# Patient Record
Sex: FEMALE | Race: WHITE | Employment: FULL TIME | URBAN - METROPOLITAN AREA
[De-identification: names, ages, dates, MRNs, and addresses within clinical notes are randomized per-mention and may not be internally consistent; named-entity substitution may affect disease eponyms.]

---

## 2018-07-13 ENCOUNTER — HOSPITAL ENCOUNTER (OUTPATIENT)
Dept: MAMMOGRAPHY | Age: 58
Discharge: HOME | End: 2018-07-13
Attending: FAMILY MEDICINE
Payer: COMMERCIAL

## 2018-07-13 DIAGNOSIS — Z12.31 VISIT FOR SCREENING MAMMOGRAM: ICD-10-CM

## 2018-07-13 PROCEDURE — 77063 BREAST TOMOSYNTHESIS BI: CPT

## 2018-07-18 ENCOUNTER — HOSPITAL ENCOUNTER (OUTPATIENT)
Dept: ULTRASOUND IMAGING | Age: 58
Discharge: HOME | End: 2018-07-18
Attending: FAMILY MEDICINE
Payer: COMMERCIAL

## 2018-07-18 ENCOUNTER — HOSPITAL ENCOUNTER (OUTPATIENT)
Dept: MAMMOGRAPHY | Age: 58
Discharge: HOME | End: 2018-07-18
Attending: FAMILY MEDICINE
Payer: COMMERCIAL

## 2018-07-18 DIAGNOSIS — N63.11 MASS OF UPPER OUTER QUADRANT OF RIGHT BREAST: ICD-10-CM

## 2018-07-18 DIAGNOSIS — R92.0 BREAST MICROCALCIFICATIONS: ICD-10-CM

## 2018-07-18 DIAGNOSIS — N63.10 BREAST MASS, RIGHT: ICD-10-CM

## 2018-07-18 PROCEDURE — 77065 DX MAMMO INCL CAD UNI: CPT

## 2018-07-18 PROCEDURE — 19084 BX BREAST ADD LESION US IMAG: CPT

## 2018-07-18 PROCEDURE — 74011000250 HC RX REV CODE- 250: Performed by: RADIOLOGY

## 2018-07-18 PROCEDURE — 77061 BREAST TOMOSYNTHESIS UNI: CPT

## 2018-07-18 PROCEDURE — 19083 BX BREAST 1ST LESION US IMAG: CPT

## 2018-07-18 PROCEDURE — 88305 TISSUE EXAM BY PATHOLOGIST: CPT | Performed by: RADIOLOGY

## 2018-07-18 PROCEDURE — A4648 IMPLANTABLE TISSUE MARKER: HCPCS

## 2018-07-18 PROCEDURE — 88374 M/PHMTRC ALYS ISHQUANT/SEMIQ: CPT | Performed by: RADIOLOGY

## 2018-07-18 PROCEDURE — 76642 ULTRASOUND BREAST LIMITED: CPT

## 2018-07-18 PROCEDURE — 88360 TUMOR IMMUNOHISTOCHEM/MANUAL: CPT | Performed by: RADIOLOGY

## 2018-07-18 RX ORDER — LIDOCAINE HYDROCHLORIDE AND EPINEPHRINE 10; 10 MG/ML; UG/ML
10 INJECTION, SOLUTION INFILTRATION; PERINEURAL
Status: COMPLETED | OUTPATIENT
Start: 2018-07-18 | End: 2018-07-18

## 2018-07-18 RX ORDER — LIDOCAINE HYDROCHLORIDE 10 MG/ML
10 INJECTION INFILTRATION; PERINEURAL
Status: COMPLETED | OUTPATIENT
Start: 2018-07-18 | End: 2018-07-18

## 2018-07-18 RX ADMIN — LIDOCAINE HYDROCHLORIDE,EPINEPHRINE BITARTRATE 10 ML: 10; .01 INJECTION, SOLUTION INFILTRATION; PERINEURAL at 12:34

## 2018-07-18 RX ADMIN — LIDOCAINE HYDROCHLORIDE 2.5 ML: 10 INJECTION, SOLUTION INFILTRATION; PERINEURAL at 12:23

## 2018-07-18 RX ADMIN — LIDOCAINE HYDROCHLORIDE,EPINEPHRINE BITARTRATE 6 ML: 10; .01 INJECTION, SOLUTION INFILTRATION; PERINEURAL at 12:14

## 2018-07-18 RX ADMIN — LIDOCAINE HYDROCHLORIDE 5.5 ML: 10 INJECTION, SOLUTION INFILTRATION; PERINEURAL at 12:08

## 2018-07-25 ENCOUNTER — NURSE NAVIGATOR (OUTPATIENT)
Dept: OTHER | Age: 58
End: 2018-07-25

## 2018-07-25 NOTE — NURSE NAVIGATOR
Telephone call to pt with new diagnosis of breast cancer for assessment of barriers. Unable to speak with pt at this time. Left message for her to call me.

## 2018-07-26 ENCOUNTER — TELEPHONE (OUTPATIENT)
Dept: OTHER | Age: 58
End: 2018-07-26

## 2018-07-26 NOTE — TELEPHONE ENCOUNTER
Call to pt newly diagnosed with breast cancer for assessment of barriers. Unable to speak with pt at this time. Unable to leave a message as voice box is full.

## 2018-08-02 ENCOUNTER — NURSE NAVIGATOR (OUTPATIENT)
Dept: OTHER | Age: 58
End: 2018-08-02

## 2018-08-02 ENCOUNTER — OFFICE VISIT (OUTPATIENT)
Dept: SURGERY | Age: 58
End: 2018-08-02

## 2018-08-02 VITALS
TEMPERATURE: 97.5 F | RESPIRATION RATE: 14 BRPM | OXYGEN SATURATION: 98 % | HEART RATE: 84 BPM | BODY MASS INDEX: 21.19 KG/M2 | SYSTOLIC BLOOD PRESSURE: 109 MMHG | HEIGHT: 70 IN | DIASTOLIC BLOOD PRESSURE: 60 MMHG | WEIGHT: 148 LBS

## 2018-08-02 DIAGNOSIS — C50.811 MALIGNANT NEOPLASM OF OVERLAPPING SITES OF RIGHT FEMALE BREAST, UNSPECIFIED ESTROGEN RECEPTOR STATUS (HCC): ICD-10-CM

## 2018-08-02 DIAGNOSIS — C50.911 MALIGNANT NEOPLASM OF RIGHT FEMALE BREAST, UNSPECIFIED ESTROGEN RECEPTOR STATUS, UNSPECIFIED SITE OF BREAST (HCC): Primary | ICD-10-CM

## 2018-08-02 RX ORDER — NORTRIPTYLINE HYDROCHLORIDE 10 MG/1
CAPSULE ORAL
Refills: 12 | COMMUNITY
Start: 2018-08-02

## 2018-08-02 NOTE — PATIENT INSTRUCTIONS
If you have any questions or concerns about today's appointment, the verbal and/or written instructions you were given for follow up care, please call our office at [phone]. [facility] [place]  [address] [facility], [address]    [phone] office  [phone] fax      Appointment: Tuesday, August 7, 2018 2:00pm/check in at 1:45pm with Dr. Sheeba Allred/Dr. Marcin Byrnes at [facility] located at [address] [address]) [phone]    Appointment: Wednesday, August 8, 2018 at 9:00am, check in at main entrance of [facility] (O) [phone]. Go to Patient Registration located directly behind the main lobby  desk         PATIENT [address]   [address]  [phone]    Before Surgery Instructions:   1) You must have someone available to drive you to and from your procedure and stay with you for the first 24 hours. 2) It is very important that you have nothing to eat or drink after midnight the night before your surgery. This includes chewing gum or sucking on hard candy. Take only heart, blood pressure and cholesterol medications the morning of surgery with only a sip of water. 3) Please stop taking Plavix 10 days prior to your surgery. Stop taking Coumadin 5 days prior to your surgery. Stop taking all Aspirin or Aspirin containing products 7 days prior to your surgery. Stop taking Advil, Motrin, Aleve, and etc. 3 days prior to your surgery. 4) If you take any diabetic medications please consult with your primary care physician on how to take them on the day of your surgery  5) Please stop all Herbal products 2 weeks prior to your surgery. 6) Please arrive at the hospital 2 hours prior to your surgery, unless you have been otherwise instructed.   7) Patients having an operation on their colon will be given a separate instruction sheet on their Bowel Prep. 8) For any pre-operative work up check in at the main entrance to [address] [place] [address], and then go to Patient Registration. These studies are done on a walk in basis they are open from 7:00am to 5:00pm Monday through Friday. 9) Please wash your surgical site the morning of your surgery with soap and water. 10) If you are of child bearing age you will have pregnancy test done the morning of your surgery as soon as you arrive. 11) You may be contacted to change your surgery time. At times this is necessary due to equipment or staffing needs. After Surgery Instructions: You will need to be seen in the office for a follow-up visit 7-14 days after your surgery. Please call after you have had the procedure to make this appointment. Unless otherwise instructed, you may remove your outer bandage and shower 48 hours after your surgery. If you develop a fever greater than 101, have any significant drainage, bleeding, swelling and/or pus of the wound. Please call our office immediately. Surgery Date and Time:  Tuesday, August 14, 2018 at 9:00am    Please check in at [facility], enter through the Emergency Room entrance and go up to the second floor. Please check in by 6:00am  the day of your surgery. You may contact Erin Bauman with any questions at [phone].

## 2018-08-02 NOTE — MR AVS SNAPSHOT
[facility] 
 
 
 [address] [facility] [address] [address] 
[phone] Patient: Jovany Suero MRN: [id number] XDT:[id number] Visit Information Date & Time Provider Department Dept. Phone Encounter #  
 8/2/2018  2:30 PM Ravinder Miller MD [address] [phone] [id number] Your Appointments 8/9/2018 11:15 AM  
Follow Up with Ravinder Miller MD  
[address] [facility] App[facility]: Follow up after Rad Onc appt [address] [facility] [address]  
  
   
 [id number] [place] [address] 88 710 [facility] [address] Upcoming Health Maintenance Date Due Hepatitis C Screening 1960 Pneumococcal 19-64 Medium Risk (1 of 1 - PPSV23) 2/8/1981 DTaP/Tdap/Td series (1 - Tdap) 2/8/1983 PAP AKA CERVICAL CYTOLOGY 2/8/1983 FOBT Q 1 YEAR AGE 50-75 2/8/2012 Influenza Age 5 to Adult 8/1/2018 BREAST CANCER SCRN MAMMOGRAM 7/18/2020 Allergies as of 8/2/2018  Review Complete On: 8/2/2018 By: Ravinder Miller MD  
  
 Severity Noted Reaction Type Reactions Pcn [Penicillins] High 03/12/2013   Systemic Rash Hives with PCN G benzathine Current Immunizations  Reviewed on 5/24/2013 No immunizations on file. Not reviewed this visit You Were Diagnosed With   
  
 Codes Comments Malignant neoplasm of right female breast, unspecified estrogen receptor status, unspecified site of breast ([facility]    -  Primary ICD-10-CM: C50.911 ICD-9-CM: 174.9 Vitals BP Pulse Temp Resp Height(growth percentile) Weight(growth percentile) 109/60 (BP 1 Location: Right arm, BP Patient Position: Sitting) 84 97.5 °F (36.4 °C) (Oral) 14 5' 10\" (1.778 m) 148 lb (67.1 kg) SpO2 BMI OB Status Smoking Status 98% 21.24 kg/m2 Hysterectomy Current Every Day Smoker BMI and BSA Data Body Mass Index Body Surface Area  
 21.24 kg/m 2 1.82 m 2 Preferred Pharmacy Pharmacy Name Phone [id number] [address], [address] [phone] Your Updated Medication List  
  
   
This list is accurate as of 8/2/18  4:17 PM.  Always use your most recent med list.  
  
  
  
  
 HYDROcodone-acetaminophen 5-325 mg per tablet Commonly known as:  Theenrico Johnson Take 2 Tabs by mouth every eight (8) hours as needed for Pain. Iron 325 mg (65 mg iron) tablet Generic drug:  ferrous sulfate Take  by mouth Daily (before breakfast). nortriptyline 10 mg capsule Commonly known as:  PAMELOR  
  
 PREMARIN 0.625 mg tablet Generic drug:  conjugated estrogens Take 0.625 mg by mouth. To-Do List   
 08/02/2018 Lab:  CBC WITH AUTOMATED DIFF   
  
 08/02/2018 ECG:  EKG, 12 LEAD, INITIAL   
  
 08/02/2018 Lab:  METABOLIC PANEL, COMPREHENSIVE   
  
 08/02/2018 Imaging:  MRI BREAST BI W WO CONT   
  
 08/02/2018 Imaging:  XR CHEST PA LAT   
  
 08/07/2018 1:45 PM  
  Appointment with [address] [place] [address] at [facility] RADIATION THERAPY ([phone]) ATTENTION: The Appointment Time in [facility] [facility] [address] may not reflect your scheduled appointment time as the time is only a place soriano. If you have any questions about your appointment time, please call [facility] Radiation Therapy at [phone].  
  
 08/08/2018 9:00 AM  
  Appointment with [facility] MRI RM 1 at [address] [facility] ([phone]) GENERAL INSTRUCTIONS  You will be required to lie still face down for 45-60 minutes. Bring information (ID card) if you have any medically implanted devices. Remove any jewelry (including body piercing, hairpins) prior to MRI. If you have had a creatinine level drawn within the past 30 days, please bring most recent results to your appt.   Bring all prior Mammogram, Breast Ultrasound, and Breast MRI films, CD's, and reports with you on the day of your exam.  This only includes studies done outside of [address] [place] [address], \[facility] [place] [facility]\"", Barak, and Luis. Bring a complete list of all medications you are currently taking to include prescriptions, over-the-counter meds, herbals, vitamins & any dietary supplements. If you were given medications for claustrophobia or anxiety, please arrange to have someone drive you to your appointment. QUESTIONS  Notify the MRI Department if you have any questions concerning your study. [facility] [address] Referral Information Referral ID Referred By Referred To  
  
 [id number] ANGI DONG Not Available Visits Status Start Date End Date 1 New Request 8/2/18 8/2/19 If your referral has a status of pending review or denied, additional information will be sent to support the outcome of this decision. Referral ID Referred By Referred To  
 [id number] DONG WHITLEY Not Available Visits Status Start Date End Date 1 New Request 8/2/18 8/2/19 If your referral has a status of pending review or denied, additional information will be sent to support the outcome of this decision. Patient Instructions If you have any questions or concerns about today's appointment, the verbal and/or written instructions you were given for follow up care, please call our office at [phone]. [facility] - [address], [address] [place] [phone] office [phone] fax Appointment: Tuesday, August 7, 2018 2:00pm/check in at 1:45pm with Dr. Adam Allred/Dr. Thomas Tam at [facility] located at [facility] [address] [facility] [address]) [phone] Appointment:  Wednesday, August 8, 2018 at 9:00am, check in at main entrance of [facility] [facility] (T) [phone]. Go to Patient Registration located directly behind the main lobby  desk PATIENT PRE AND POST OPERATIVE INSTRUCTIONS [address] [place] [address] [facility] [address] 
[phone] Before Surgery Instructions:  
1) You must have someone available to drive you to and from your procedure and stay with you for the first 24 hours. 2) It is very important that you have nothing to eat or drink after midnight the night before your surgery. This includes chewing gum or sucking on hard candy. Take only heart, blood pressure and cholesterol medications the morning of surgery with only a sip of water. 3) Please stop taking Plavix 10 days prior to your surgery. Stop taking Coumadin 5 days prior to your surgery. Stop taking all Aspirin or Aspirin containing products 7 days prior to your surgery. Stop taking Advil, Motrin, Aleve, and etc. 3 days prior to your surgery. 4) If you take any diabetic medications please consult with your primary care physician on how to take them on the day of your surgery 5) Please stop all Herbal products 2 weeks prior to your surgery. 6) Please arrive at the hospital 2 hours prior to your surgery, unless you have been otherwise instructed. 7) Patients having an operation on their colon will be given a separate instruction sheet on their Bowel Prep. 8) For any pre-operative work up check in at the main entrance to [address] [place] [address], and then go to Patient Registration. These studies are done on a walk in basis they are open from 7:00am to 5:00pm Monday through Friday. 9) Please wash your surgical site the morning of your surgery with soap and water. 10) If you are of child bearing age you will have pregnancy test done the morning of your surgery as soon as you arrive. 11) You may be contacted to change your surgery time. At times this is necessary due to equipment or staffing needs. After Surgery Instructions: You will need to be seen in the office for a follow-up visit 7-14 days after your surgery. Please call after you have had the procedure to make this appointment. Unless otherwise instructed, you may remove your outer bandage and shower 48 hours after your surgery. If you develop a fever greater than 101, have any significant drainage, bleeding, swelling and/or pus of the wound. Please call our office immediately. Surgery Date and Time:  Tuesday, August 14, 2018 at 9:00am 
 
Please check in at [facility], enter through the Emergency Room entrance and go up to the second floor. Please check in by 6:00am  the day of your surgery. You may contact Tosin Nickerson with any questions at [phone]. Introducing [facility]! Darwin Walton introduces [employer] patient portal. Now you can access parts of your medical record, email your doctor's office, and request medication refills online. 1. In your internet browser, go to [email or web address] 2. Click on the First Time User? Click Here link in the Sign In box. You will see the New Member Sign Up page. 3. Enter your Finder[employer] Access Code exactly as it appears below. You will not need to use this code after youve completed the sign-up process. If you do not sign up before the expiration date, you must request a new code. · F[employer] Access Code: [email or web address] Expires: 10/8/2018 10:12 AM 
 
4. Enter the last four digits of your Social Security Number (xxxx) and Date of Birth (mm/dd/yyyy) as indicated and click Submit. You will be taken to the next sign-up page. 5. Create a IngagePatientt ID. This will be your Finderly login ID and cannot be changed, so think of one that is secure and easy to remember. 6. Create a Finder[employer] password. You can change your password at any time. 7. Enter your Password Reset Question and Answer. This can be used at a later time if you forget your password. 8. Enter your e-mail address. You will receive e-mail notification when new information is available in [address] 9. Click Sign Up. You can now view and download portions of your medical record. 10. Click the Download Summary menu link to download a portable copy of your medical information. If you have questions, please visit the Frequently Asked Questions section of the [employer] website. Remember, [employer] is NOT to be used for urgent needs. For medical emergencies, dial 911. Now available from your iPhone and Android! Please provide this summary of care documentation to your next provider. Your primary care clinician is listed as Urmila Mann. If you have any questions after today's visit, please call [phone].

## 2018-08-02 NOTE — COMMUNICATION BODY
Dear Dr. Mellisa Anderson came to the office today for evaluation and discussion regarding surgical treatment of her recently diagnosed carcinomas of the right breast.  She has 2 simultaneous lesions-one located at the 8 o'clock position and the other one at the 10 o'clock position. Each of these are invasive carcinomas and apparently each estrogen and progesterone receptor positive but HER-2 negative. She has never had any previous breast problems. Her mother developed breast cancer at about the age of 76 and a paternal grandmother at about the age of 76. On examination today she does have 2 palpable masses in the locations mentioned above. Medically I do not feel any axillary adenopathy. I recommended that we get an MRI first and consultation with radiation oncology. But I have recommended that she probably can have a right breast partial mastectomy (lumpectomy) and sentinel node biopsy.   We will be proceeding with her surgery fairly soon per her request.    With kindest regards,    Yamel Mi MD    Cc: Dr. Anabella Kiser

## 2018-08-02 NOTE — PROGRESS NOTES
History of present illness    Marleni Reji comes to the office today for newly diagnosed right breast cancer. The patient had a recent right mammogram that showed 2 spiculated areas in the lateral right breast at the 8 o'clock position 4 cm from the nipple and at the 10 o'clock position 7 cm from the nipple. Stereotactic biopsies of each of these were performed and both of these showed invasive ductal carcinoma apparently grade 2. It appears that they are both estrogen and progestin receptor positive but HER-2 negative. I have personally reviewed the patient's mammograms and discussed them with the radiologist.  The patient had been previously told her diagnosis by the radiologist.    The patient's menarche occurred at the age of 17. She had a hysterectomy in  because of heavy menstrual bleeding. She started having some mild hot flashes and believes she went through hormonal menopause in about . She did take birth control pills off and on for most 25 years. She is  4 para 4 and was age 27 at the time of her first pregnancy. She did not breast-feed. Her mother developed breast cancer at the age of 76 her paternal grandmother at the age of 76. She may have had a maternal great aunt also with breast cancer at the age of 76. There is no family history of ovarian cancer.     Allergies   Allergen Reactions    Pcn [Penicillins] Rash     Hives with PCN G benzathine     Past Medical History:   Diagnosis Date    Anemia NEC     Fibroids 3/12/2013    Pelvic pain 3/12/2013    Restless leg syndrome      Past Surgical History:   Procedure Laterality Date    HX BREAST BIOPSY Right 2018    HX  SECTION  0221,6380,1863    x3    HX HYSTERECTOMY      HX TUBAL LIGATION       Social History     Social History    Marital status:      Spouse name: N/A    Number of children: N/A    Years of education: N/A     Social History Main Topics    Smoking status: Current Every Day Smoker     Packs/day: 0.50     Types: Cigarettes    Smokeless tobacco: Never Used    Alcohol use No    Drug use: No    Sexual activity: Yes     Partners: Male      Comment: tubal     Other Topics Concern    None     Social History Narrative     REVIEW OF SYSTEMS     Constitutional: No fever, weight loss, fatigue or recent chills. Skin:  No recent rashes, dermatitis or abnormal moles. HEENT:  No changes in vision, vertigo, epistaxis, dysphasia, or hoarseness. Cardiac:  No chest pain, palpitations, or edema. Respiratory: No chronic cough, shortness of breath, wheezing, hemoptysis, or history of sleep apnea. Breasts/GYN:  No history of recent breast lumps or nipple discharge. Mammograms are up to date. No GYN-type problems. See the history of present illness     Gastrointestinal:  No significant food intolerances, no recent vomiting, no chronic abdominal pain, no change in bowel habits, no melena. No history of GERD. Genitourinary:  No history of hematuria, dysuria, frequency, or stress urinary incontinence. No nocturia. Musculoskeletal: No weakness, joint pains, or arthritis. Endocrine:  No history of thyroid disease. No diabetes. Lymph/hemo:  No history of blood transfusions or easy bruising. History of blood loss anemia secondary to big fibroids. Neuro: No dizziness or headaches or fainting. Visit Vitals    /60 (BP 1 Location: Right arm, BP Patient Position: Sitting)    Pulse 84    Temp 97.5 °F (36.4 °C) (Oral)    Resp 14    Ht 5' 10\" (1.778 m)    Wt 67.1 kg (148 lb)    SpO2 98%    BMI 21.24 kg/m2   PHYSICAL EXAM     Constitutional:  Well-developed, well-nourished, no acute distress. Head:  Head, eyes, ears, nose, throat within normal limits. Skin:  No suspicious moles or rashes. Neck:  No masses or adenopathy. The airway appears normal. Thyroid is not enlarged and there are no palpable thyroid nodules.      Lungs:  Lungs are clear to auscultation and percussion. No respiratory distress. No chest wall tenderness. Heart:  Heart is regular with no extra heart sounds or murmur heard. Breast Exam: The breasts are free of any discrete tenderness or masses except in the area where the patient had the recent biopsies of the lateral right breast.  The skin and nipple areas appear normal. Both axillae are negative. However the patient does have palpable nodular areas in the lateral right breast located in the 8 o'clock position almost 6 cm from the nipple in the 10 o'clock position 8 cm from the nipple. There is some bruising in this area to from the previous biopsies. It is unclear whether the palpable masses are small hematomas or the actual tumors that I am feeling. Abdomen: The abdomen is soft and nontender without organomegaly or masses. Bowel sounds are active and of normal pitch. There is no abdominal distention. No hernias are evident. Extremities:  No tenderness of the extremities and no significant swelling. Psych:  Alert and oriented. Assessment: Invasive ductal carcinoma in 2 areas of the right breast at 8:00 6 cm from the nipple and 10:00 8 cm from the nipple. Tumors are estrogen and progesterone receptor positive but HER-2 negative. On mammogram the mass in the 8 o'clock position measures about 6 mm in the 10 o'clock position about 2.2 cm. #2 history of anemia. Recommendations: I had a long discussion with the patient and her family. Options for treatment were discussed. I did recommend that she have an MRI to further evaluate these 2 lesions and their relationship. I do believe that the patient could probably have both lumps removed through a single incision but did state that this would certainly take out significant tissue from the breast and she would notice a difference in size. But a lumpectomy with subsequent radiation therapy presently would be my recommendation.   I have asked that she see the radiotherapist for their consultation and recommendations. Patient wishes to proceed soon with the surgery so we will see her back after accomplishing the above. The above was dictated with Kate. There may be unrecognized errors.     Isma Pérez MD

## 2018-08-03 ENCOUNTER — TELEPHONE (OUTPATIENT)
Dept: SURGERY | Age: 58
End: 2018-08-03

## 2018-08-03 ENCOUNTER — HOSPITAL ENCOUNTER (OUTPATIENT)
Dept: LAB | Age: 58
Discharge: HOME | End: 2018-08-03
Payer: COMMERCIAL

## 2018-08-03 ENCOUNTER — HOSPITAL ENCOUNTER (OUTPATIENT)
Dept: OTHER | Age: 58
Discharge: HOME | End: 2018-08-03
Payer: COMMERCIAL

## 2018-08-03 ENCOUNTER — HOSPITAL ENCOUNTER (OUTPATIENT)
Dept: PREADMISSION TESTING | Age: 58
Discharge: HOME | End: 2018-08-03
Payer: COMMERCIAL

## 2018-08-03 DIAGNOSIS — C50.911 MALIGNANT NEOPLASM OF RIGHT FEMALE BREAST, UNSPECIFIED ESTROGEN RECEPTOR STATUS, UNSPECIFIED SITE OF BREAST (HCC): ICD-10-CM

## 2018-08-03 DIAGNOSIS — C50.911 MALIGNANT NEOPLASM OF RIGHT FEMALE BREAST, UNSPECIFIED ESTROGEN RECEPTOR STATUS, UNSPECIFIED SITE OF BREAST (HCC): Primary | ICD-10-CM

## 2018-08-03 LAB
ALBUMIN SERPL-MCNC: 4.2 G/DL (ref 3.4–5)
ALBUMIN/GLOB SERPL: 1.5 {RATIO} (ref 0.8–1.7)
ALP SERPL-CCNC: 72 U/L (ref 45–117)
ALT SERPL-CCNC: 16 U/L (ref 13–56)
ANION GAP SERPL CALC-SCNC: 3 MMOL/L (ref 3–18)
AST SERPL-CCNC: 8 U/L (ref 15–37)
ATRIAL RATE: 66 BPM
BASOPHILS # BLD: 0 K/UL (ref 0–0.1)
BASOPHILS NFR BLD: 0 % (ref 0–2)
BILIRUB SERPL-MCNC: 0.3 MG/DL (ref 0.2–1)
BUN SERPL-MCNC: 13 MG/DL (ref 7–18)
BUN/CREAT SERPL: 21 (ref 12–20)
CALCIUM SERPL-MCNC: 9.4 MG/DL (ref 8.5–10.1)
CALCULATED P AXIS, ECG09: 71 DEGREES
CALCULATED R AXIS, ECG10: 68 DEGREES
CALCULATED T AXIS, ECG11: 71 DEGREES
CHLORIDE SERPL-SCNC: 105 MMOL/L (ref 100–108)
CO2 SERPL-SCNC: 33 MMOL/L (ref 21–32)
CREAT SERPL-MCNC: 0.63 MG/DL (ref 0.6–1.3)
DIAGNOSIS, 93000: NORMAL
DIFFERENTIAL METHOD BLD: ABNORMAL
EOSINOPHIL # BLD: 0.1 K/UL (ref 0–0.4)
EOSINOPHIL NFR BLD: 2 % (ref 0–5)
ERYTHROCYTE [DISTWIDTH] IN BLOOD BY AUTOMATED COUNT: 14.1 % (ref 11.6–14.5)
GLOBULIN SER CALC-MCNC: 2.8 G/DL (ref 2–4)
GLUCOSE SERPL-MCNC: 96 MG/DL (ref 74–99)
HCT VFR BLD AUTO: 42.8 % (ref 35–45)
HGB BLD-MCNC: 14.5 G/DL (ref 12–16)
LYMPHOCYTES # BLD: 2.1 K/UL (ref 0.9–3.6)
LYMPHOCYTES NFR BLD: 32 % (ref 21–52)
MCH RBC QN AUTO: 29.5 PG (ref 24–34)
MCHC RBC AUTO-ENTMCNC: 33.9 G/DL (ref 31–37)
MCV RBC AUTO: 87 FL (ref 74–97)
MONOCYTES # BLD: 0.4 K/UL (ref 0.05–1.2)
MONOCYTES NFR BLD: 5 % (ref 3–10)
NEUTS SEG # BLD: 4.1 K/UL (ref 1.8–8)
NEUTS SEG NFR BLD: 61 % (ref 40–73)
P-R INTERVAL, ECG05: 144 MS
PLATELET # BLD AUTO: 201 K/UL (ref 135–420)
PMV BLD AUTO: 12.7 FL (ref 9.2–11.8)
POTASSIUM SERPL-SCNC: 4.8 MMOL/L (ref 3.5–5.5)
PROT SERPL-MCNC: 7 G/DL (ref 6.4–8.2)
Q-T INTERVAL, ECG07: 422 MS
QRS DURATION, ECG06: 88 MS
QTC CALCULATION (BEZET), ECG08: 442 MS
RBC # BLD AUTO: 4.92 M/UL (ref 4.2–5.3)
SODIUM SERPL-SCNC: 141 MMOL/L (ref 136–145)
VENTRICULAR RATE, ECG03: 66 BPM
WBC # BLD AUTO: 6.8 K/UL (ref 4.6–13.2)

## 2018-08-03 PROCEDURE — 36415 COLL VENOUS BLD VENIPUNCTURE: CPT | Performed by: SPECIALIST

## 2018-08-03 PROCEDURE — 71046 X-RAY EXAM CHEST 2 VIEWS: CPT

## 2018-08-03 PROCEDURE — 80053 COMPREHEN METABOLIC PANEL: CPT | Performed by: SPECIALIST

## 2018-08-03 PROCEDURE — 85025 COMPLETE CBC W/AUTO DIFF WBC: CPT | Performed by: SPECIALIST

## 2018-08-03 PROCEDURE — 93005 ELECTROCARDIOGRAM TRACING: CPT

## 2018-08-03 NOTE — NURSE NAVIGATOR
Initial assessment for Sincere Mckeon, newly diagnosed with IDC right breast cancer. Meeting with pt included pt and numerous family members. Patient was assessed for the following barriers:    Communication:       Yes    No  -Ability to read/write,understand [place]       []      []    -Ability to talk with family/children,friends about diagnosis     [x]      []    -Other:  Comments/Referrals/Services provided: She is able to speak with her family. 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 No financial difficulties at this time. Psychosocial        Yes    No  Housing:  -Homeless           []      [x]    -Extended care needs: long term care/home care/Hospice     []      [x]    -Other:  Comments/Referrals/Services provided: She lives in a house. Transportation:       Yes    No  -Lack of vehicle or public transportation options      []      [x]    -Funds need for public transportation: bus/taxi      []      [x]    -Other:  Comments/Referrals/Services provided: She drives herself. Support system:       Yes No  -Family members at home: spouse/significant other/children    [x]      []    -Has a support system         [x]      []    Other:  Comments/Referrals/Services provided: She lives with her spouse, daughter and grandson. She declines services from [facility] at this time.   Spirituality:        Yes No  -Spiritual issues          []      [x]    -Cultural concerns []      [x]    -Other:  -Comments/Referrals/Services provided: She attends [employer]. Sexuality:        Yes No  -Body image concerns                     []      [x]    -Relationships/significant other issues        []      [x]    -Other:  Comments/Referrals/Services provided: No concerns today. Coping:        Yes    No  -Able to manage emotions         [x]      []    -Feeling fearful or anxious         [x]      []    -Interest in attending support groups        []      [x]    -Cancer related pain/control         [x]      []    -Other:  Comments/Referrals/Services provided: She is worried today. Tobacco dependency:      Yes No  -Currently smokes: cigarettes/cigars        [x]      []    -Uses smokeless tobacco         []      [x]    -Other:  Comments/Referrals/Services provided: She smokes 1/2 pk every day. Smoking cessation information given. Education/review of Disease process and Management Yes No  -Explanation of navigator role/contact information      [x]      []    -New Patient Guide for Breast Cancer provided                 [x]      []    -Pathology/Staging          [x]      []    -Diagnostic tests          [x]      []    -Genetic testing needed         []      [x]    -Treatment options/plan: surgery/chemotherapy/radiation     [x]      []    -Mediport placement as needed                              []      [x]    -Tobacco cessation as needed        [x]      []    -Need for a second opinion         [x]      []    -Importance of bringing family/friends to medical appts     [x]      []    -Other:  Patient/family verbalized understanding of treatment plan: Yes. She is being scheduled for a bilateral MRI and will consult with radiation oncology before returning to Dr Mitchel Albrecht office to discuss future treatment plans. NCCN Distress Tool    Charlee Palm  was assessed for management of distress using the NCCN Distress Thermometer for Patients tool.       Mild to moderate distress  Scoring: 0-4        Yes    No    -Practical/physical issues       [x]      []      -Provide community resources      [x]      []      -Provide list of support groups      [x]      []      -Provide list of national organizations and websites               [x]      []      -Provide ongoing supportive care by oncology medical team        [x]      []                  Comments/Referrals/Services provided: Moderate to severe distress  Scorin or greater                   Yes    No    -Navigator consulted with MD      [x]      []     -Navigator follow up         [x]      []     -Spiritual concerns        []      [x]     -Emotional/family concerns       [x]      []     -Refer to /mental health professional/PCP   [x]      []      Comments/Referral/Services provided:  Score: 6. Dr Torres Expose aware. She will speak with her PCP about her new diagnosis. She will be reassessed in the future.        Patient Acuity  0    No navigation        Patient declined services or never returned call    1    Initial education/guidance only        No follow up needed    2    Moderate intensity of needs        Multimodality treatment        Ongoing education/guidance for 5-6 months        No barriers    3    High intensity of needs        Multimodality treatment        Hospitalizations associated with care        Ongoing education/guidance for 6-12 months        Barriers: 1-2    4     High intensity of needs         Multimodality treatment         Coordinated care outside of facility         Ongoing education/guidance for 6-12 months         Barriers: 3 or more    Acuity score: 3

## 2018-08-07 ENCOUNTER — HOSPITAL ENCOUNTER (OUTPATIENT)
Dept: RADIATION THERAPY | Age: 58
Discharge: HOME | End: 2018-08-07

## 2018-08-08 ENCOUNTER — HOSPITAL ENCOUNTER (OUTPATIENT)
Dept: MRI IMAGING | Age: 58
Discharge: HOME | End: 2018-08-08
Attending: SPECIALIST
Payer: COMMERCIAL

## 2018-08-08 VITALS — WEIGHT: 148 LBS | BODY MASS INDEX: 21.24 KG/M2

## 2018-08-08 DIAGNOSIS — C50.911 MALIGNANT NEOPLASM OF RIGHT FEMALE BREAST, UNSPECIFIED ESTROGEN RECEPTOR STATUS, UNSPECIFIED SITE OF BREAST (HCC): Primary | ICD-10-CM

## 2018-08-08 DIAGNOSIS — C50.911 MALIGNANT NEOPLASM OF RIGHT FEMALE BREAST, UNSPECIFIED ESTROGEN RECEPTOR STATUS, UNSPECIFIED SITE OF BREAST (HCC): ICD-10-CM

## 2018-08-08 PROCEDURE — A9575 INJ GADOTERATE MEGLUMI 0.1ML: HCPCS | Performed by: SPECIALIST

## 2018-08-08 PROCEDURE — 74011250636 HC RX REV CODE- 250/636: Performed by: SPECIALIST

## 2018-08-08 PROCEDURE — 77059 MRI BREAST BI W WO CONT: CPT

## 2018-08-08 RX ORDER — GADOTERATE MEGLUMINE 376.9 MG/ML
20 INJECTION INTRAVENOUS
Status: COMPLETED | OUTPATIENT
Start: 2018-08-08 | End: 2018-08-08

## 2018-08-08 RX ADMIN — GADOTERATE MEGLUMINE 20 ML: 376.9 INJECTION INTRAVENOUS at 10:08

## 2018-08-09 ENCOUNTER — OFFICE VISIT (OUTPATIENT)
Dept: SURGERY | Age: 58
End: 2018-08-09

## 2018-08-09 VITALS
BODY MASS INDEX: 21.47 KG/M2 | HEIGHT: 70 IN | SYSTOLIC BLOOD PRESSURE: 109 MMHG | DIASTOLIC BLOOD PRESSURE: 55 MMHG | WEIGHT: 150 LBS | RESPIRATION RATE: 20 BRPM | TEMPERATURE: 96.5 F | HEART RATE: 75 BPM

## 2018-08-09 DIAGNOSIS — C50.411 MALIGNANT NEOPLASM OF UPPER-OUTER QUADRANT OF RIGHT BREAST IN FEMALE, ESTROGEN RECEPTOR POSITIVE (HCC): Primary | ICD-10-CM

## 2018-08-09 DIAGNOSIS — Z17.0 MALIGNANT NEOPLASM OF UPPER-OUTER QUADRANT OF RIGHT BREAST IN FEMALE, ESTROGEN RECEPTOR POSITIVE (HCC): Primary | ICD-10-CM

## 2018-08-09 NOTE — PROGRESS NOTES
Sara Cano came back to the office today to have further discussion regarding surgical treatment of her right breast cancer. Dr. Phi Lira and I talked with the patient and reexamined her. We talked with her family as well. Most of the time was spent in discussing findings of her recent MRI in the conversation she had with the radiation therapist.  In addition we talked in detail about I recommended surgical treatment. The patient's MRI shows that indeed she does have 2 lesions that seem to be connected by some enhancing tissue suggesting that this may be a \"dumbbell\" shaped tumor versus 2 separate lesions. They are about 4-5 cm apart. One is located in approximately the 8 o'clock position of the right breast 4 cm from the nipple and the other one in the 10 o'clock position about 7 cm from the nipple. One in the 10 o'clock position is the larger of the 2. We believe that the patient can have a lumpectomy with subsequent probable whole breast radiation therapy. I do not believe brachii therapy probably would be advisable in this patient. We will also plan to do a sentinel node biopsy and discussed the possibility of a completion axillary dissection. Risks and complications were discussed in detail including bleeding, infection, numbness of the right arm in the axilla and triceps area and subsequent lymphedema. We briefly touched on chemotherapy and hormonal therapy but told the patient that final decisions about chemotherapy will depend on the final pathology report.     Allergies   Allergen Reactions    Pcn [Penicillins] Rash     Hives with PCN G benzathine     Past Medical History:   Diagnosis Date    Anemia NEC     Fibroids 3/12/2013    Pelvic pain 3/12/2013    Restless leg syndrome      Past Surgical History:   Procedure Laterality Date    HX BREAST BIOPSY Right 2018    HX  SECTION  [id number]    x3    HX HYSTERECTOMY      HX TUBAL LIGATION       Social History Social History    Marital status:      Spouse name: N/A    Number of children: N/A    Years of education: N/A     Social History Main Topics    Smoking status: Current Every Day Smoker     Packs/day: 0.50     Types: Cigarettes    Smokeless tobacco: Never Used    Alcohol use No    Drug use: No    Sexual activity: Yes     Partners: Male      Comment: tubal     Other Topics Concern    None     Social History Narrative     PHYSICAL EXAM    Visit Vitals    /55 (BP 1 Location: Left arm, BP Patient Position: At rest)    Pulse 75    Temp 96.5 °F (35.8 °C) (Oral)    Resp 20    Ht 5' 10\" (1.778 m)    Wt 68 kg (150 lb)    BMI 21.52 kg/m2          Constitutional:  Well-developed, well-nourished, no acute distress. Breast Exam: The breasts are free of any discrete tenderness or masses except in the right breast with the patient does have a palpable mass located in about the 8 o'clock position 4 cm from the nipple which is somewhat smaller and than another larger one in about the 10 o'clock position 7 cm from the nipple measuring about 2 cm. The skin and nipple areas appear normal. Both axillae are negative. Psych:  Alert and oriented. Assessment: Invasive ductal grade 2 carcinoma which is estrogen and progestin receptor positive but HER-2 negative located in the 8:00 and 10:00 positions of the right breast 4 and 7 cm from the nipple respectively. Recommendations: We agree that we will proceed with a right breast lumpectomy with localization sentinel node biopsy, possible completion axillary dissection, and possible placement of a BioSorb device. Time spent with the patient was about 25 minutes. The above was dictated with Kate. There may be unrecognized errors.     Wil Montana MD

## 2018-08-09 NOTE — PROGRESS NOTES
Louisa Gomes is a 66 y.o. female who presents today with   Chief Complaint   Patient presents with    Breast Problem     Pt presents today to discuss surgical options. Pre op for Right Breast Lumpectomy with SLN           1. Have you been to the ER, urgent care clinic since your last visit? Hospitalized since your last visit? No    2. Have you seen or consulted any other health care providers outside of the [address] [place] [address] since your last visit? Include any pap smears or colon screening.  No

## 2018-08-13 ENCOUNTER — ANESTHESIA EVENT (OUTPATIENT)
Dept: SURGERY | Age: 58
End: 2018-08-13
Payer: COMMERCIAL

## 2018-08-13 NOTE — PERIOP NOTES
PAT - SURGICAL PRE-ADMISSION INSTRUCTIONS    NAME:  Ean Barillas                                                          TODAY'S DATE:  8/13/2018    SURGERY DATE:  8/14/2018                                  SURGERY ARRIVAL TIME:   0600    1. Do NOT eat or drink anything, including candy or gum, after MIDNIGHT on 8/13/18 , unless you have specific instructions from your Surgeon or Anesthesia Provider to do so. 2. No smoking on the day of surgery. 3. No alcohol 24 hours prior to the day of surgery. 4. No recreational drugs for one week prior to the day of surgery. 5. Leave all valuables, including money/purse, at home. 6. Remove all jewelry, nail polish, makeup (including mascara); no lotions, powders, deodorant, or perfume/cologne/after shave. 7. Glasses/Contact lenses and Dentures may be worn to the hospital.  They will be removed prior to surgery. 8. Call your doctor if symptoms of a cold or illness develop within 24 ours prior to surgery. 9. AN ADULT MUST DRIVE YOU HOME AFTER OUTPATIENT SURGERY. 10. If you are having an OUTPATIENT procedure, please make arrangements for a responsible adult to be with you for 24 hours after your surgery. 11. If you are admitted to the hospital, you will be assigned to a bed after surgery is complete. Normally a family member will not be able to see you until you are in your assigned bed. 15. Family is encouraged to accompany you to the hospital.  We ask visitors in the treatment area to be limited to ONE person at a time to ensure patient privacy. EXCEPTIONS WILL BE MADE AS NEEDED. 15. Children under 12 are discouraged from entering the treatment area and need to be supervised by an adult when in the waiting room. Special Instructions:    NONE. Patient Prep:    shower with anti-bacterial soap    These surgical instructions were reviewed with PATIENT during the PAT PHONE CALL. Directions:   On the morning of surgery, please go to the [facility]. Enter the building from the [facility] entrance, 1st floor (next to the Emergency Room entrance). Take the elevator to the 2nd floor. Sign in at the Registration Desk.     If you have any questions and/or concerns, please do not hesitate to call:  (Prior to the day of surgery)  [facility] unit:  [phone]  (Day of surgery)  [facility] unit:  [phone]

## 2018-08-14 ENCOUNTER — ANESTHESIA (OUTPATIENT)
Dept: SURGERY | Age: 58
End: 2018-08-14
Payer: COMMERCIAL

## 2018-08-14 ENCOUNTER — APPOINTMENT (OUTPATIENT)
Dept: NUCLEAR MEDICINE | Age: 58
End: 2018-08-14
Attending: SPECIALIST
Payer: COMMERCIAL

## 2018-08-14 ENCOUNTER — NURSE NAVIGATOR (OUTPATIENT)
Dept: OTHER | Age: 58
End: 2018-08-14

## 2018-08-14 ENCOUNTER — APPOINTMENT (OUTPATIENT)
Dept: ULTRASOUND IMAGING | Age: 58
End: 2018-08-14
Attending: SPECIALIST
Payer: COMMERCIAL

## 2018-08-14 ENCOUNTER — APPOINTMENT (OUTPATIENT)
Dept: MAMMOGRAPHY | Age: 58
End: 2018-08-14
Attending: SPECIALIST
Payer: COMMERCIAL

## 2018-08-14 ENCOUNTER — HOSPITAL ENCOUNTER (OUTPATIENT)
Age: 58
Setting detail: OUTPATIENT SURGERY
Discharge: HOME | End: 2018-08-14
Attending: SPECIALIST | Admitting: SPECIALIST
Payer: COMMERCIAL

## 2018-08-14 VITALS
TEMPERATURE: 97 F | RESPIRATION RATE: 16 BRPM | DIASTOLIC BLOOD PRESSURE: 52 MMHG | OXYGEN SATURATION: 96 % | HEART RATE: 66 BPM | SYSTOLIC BLOOD PRESSURE: 108 MMHG | HEIGHT: 70 IN | BODY MASS INDEX: 21.19 KG/M2 | WEIGHT: 148 LBS

## 2018-08-14 DIAGNOSIS — Z85.3 PERSONAL HISTORY OF BREAST CANCER: ICD-10-CM

## 2018-08-14 DIAGNOSIS — N63.0 BREAST MASS: ICD-10-CM

## 2018-08-14 DIAGNOSIS — C50.911 MALIGNANT NEOPLASM OF RIGHT FEMALE BREAST, UNSPECIFIED ESTROGEN RECEPTOR STATUS, UNSPECIFIED SITE OF BREAST (HCC): ICD-10-CM

## 2018-08-14 PROCEDURE — 77030031139 HC SUT VCRL2 J&J -A: Performed by: SPECIALIST

## 2018-08-14 PROCEDURE — 77030010938 HC CLP LIG TELE -A: Performed by: SPECIALIST

## 2018-08-14 PROCEDURE — 74011250636 HC RX REV CODE- 250/636: Performed by: NURSE ANESTHETIST, CERTIFIED REGISTERED

## 2018-08-14 PROCEDURE — 76010000162 HC OR TIME 1.5 TO 2 HR INTENSV-TIER 1: Performed by: SPECIALIST

## 2018-08-14 PROCEDURE — 76210000021 HC REC RM PH II 0.5 TO 1 HR: Performed by: SPECIALIST

## 2018-08-14 PROCEDURE — 88307 TISSUE EXAM BY PATHOLOGIST: CPT | Performed by: SPECIALIST

## 2018-08-14 PROCEDURE — A4648 IMPLANTABLE TISSUE MARKER: HCPCS | Performed by: SPECIALIST

## 2018-08-14 PROCEDURE — 74011250637 HC RX REV CODE- 250/637: Performed by: NURSE ANESTHETIST, CERTIFIED REGISTERED

## 2018-08-14 PROCEDURE — 74011250636 HC RX REV CODE- 250/636

## 2018-08-14 PROCEDURE — 76210000006 HC OR PH I REC 0.5 TO 1 HR: Performed by: SPECIALIST

## 2018-08-14 PROCEDURE — 74011250636 HC RX REV CODE- 250/636: Performed by: SPECIALIST

## 2018-08-14 PROCEDURE — 77065 DX MAMMO INCL CAD UNI: CPT

## 2018-08-14 PROCEDURE — 77030018836 HC SOL IRR NACL ICUM -A: Performed by: SPECIALIST

## 2018-08-14 PROCEDURE — 77030039266 HC ADH SKN EXOFIN S2SG -A: Performed by: SPECIALIST

## 2018-08-14 PROCEDURE — 74011636320 HC RX REV CODE- 636/320: Performed by: RADIOLOGY

## 2018-08-14 PROCEDURE — 77030003456 US PLC NDL/WIRE/CLIP/SEED BREAST RT

## 2018-08-14 PROCEDURE — 77030013079 HC BLNKT BAIR HGGR 3M -A: Performed by: NURSE ANESTHETIST, CERTIFIED REGISTERED

## 2018-08-14 PROCEDURE — 74011636320 HC RX REV CODE- 636/320: Performed by: SPECIALIST

## 2018-08-14 PROCEDURE — 77030003028 HC SUT VCRL J&J -A: Performed by: SPECIALIST

## 2018-08-14 PROCEDURE — 88332 PATH CONSLTJ SURG EA ADD BLK: CPT | Performed by: SPECIALIST

## 2018-08-14 PROCEDURE — 76060000034 HC ANESTHESIA 1.5 TO 2 HR: Performed by: SPECIALIST

## 2018-08-14 PROCEDURE — 88341 IMHCHEM/IMCYTCHM EA ADD ANTB: CPT | Performed by: SPECIALIST

## 2018-08-14 PROCEDURE — 88331 PATH CONSLTJ SURG 1 BLK 1SPC: CPT | Performed by: SPECIALIST

## 2018-08-14 PROCEDURE — 74011250636 HC RX REV CODE- 250/636: Performed by: RADIOLOGY

## 2018-08-14 PROCEDURE — 77030010516 HC APPL HEMA CLP TELE -B: Performed by: SPECIALIST

## 2018-08-14 PROCEDURE — 77030008462 HC STPLR SKN PROX J&J -A: Performed by: SPECIALIST

## 2018-08-14 PROCEDURE — A9541 TC99M SULFUR COLLOID: HCPCS

## 2018-08-14 PROCEDURE — 88342 IMHCHEM/IMCYTCHM 1ST ANTB: CPT | Performed by: SPECIALIST

## 2018-08-14 PROCEDURE — 77030032490 HC SLV COMPR SCD KNE COVD -B: Performed by: SPECIALIST

## 2018-08-14 PROCEDURE — 77030002916 HC SUT ETHLN J&J -A: Performed by: SPECIALIST

## 2018-08-14 PROCEDURE — 77030002933 HC SUT MCRYL J&J -A: Performed by: SPECIALIST

## 2018-08-14 PROCEDURE — 74011000250 HC RX REV CODE- 250: Performed by: SPECIALIST

## 2018-08-14 PROCEDURE — 77030011267 HC ELECTRD BLD COVD -A: Performed by: SPECIALIST

## 2018-08-14 PROCEDURE — 77030012510 HC MSK AIRWY LMA TELE -B: Performed by: NURSE ANESTHETIST, CERTIFIED REGISTERED

## 2018-08-14 PROCEDURE — 74011250637 HC RX REV CODE- 250/637: Performed by: ANESTHESIOLOGY

## 2018-08-14 PROCEDURE — 77030002986 HC SUT PROL J&J -A: Performed by: SPECIALIST

## 2018-08-14 RX ORDER — SODIUM CHLORIDE 0.9 % (FLUSH) 0.9 %
5-10 SYRINGE (ML) INJECTION AS NEEDED
Status: DISCONTINUED | OUTPATIENT
Start: 2018-08-14 | End: 2018-08-14 | Stop reason: HOSPADM

## 2018-08-14 RX ORDER — SODIUM CHLORIDE, SODIUM LACTATE, POTASSIUM CHLORIDE, CALCIUM CHLORIDE 600; 310; 30; 20 MG/100ML; MG/100ML; MG/100ML; MG/100ML
25 INJECTION, SOLUTION INTRAVENOUS CONTINUOUS
Status: DISCONTINUED | OUTPATIENT
Start: 2018-08-14 | End: 2018-08-14 | Stop reason: HOSPADM

## 2018-08-14 RX ORDER — ONDANSETRON 2 MG/ML
4 INJECTION INTRAMUSCULAR; INTRAVENOUS ONCE
Status: COMPLETED | OUTPATIENT
Start: 2018-08-14 | End: 2018-08-14

## 2018-08-14 RX ORDER — PROPOFOL 10 MG/ML
INJECTION, EMULSION INTRAVENOUS AS NEEDED
Status: DISCONTINUED | OUTPATIENT
Start: 2018-08-14 | End: 2018-08-14 | Stop reason: HOSPADM

## 2018-08-14 RX ORDER — FAMOTIDINE 20 MG/1
TABLET, FILM COATED ORAL
Status: DISCONTINUED
Start: 2018-08-14 | End: 2018-08-14 | Stop reason: HOSPADM

## 2018-08-14 RX ORDER — ISOSULFAN BLUE 50 MG/5ML
INJECTION, SOLUTION SUBCUTANEOUS AS NEEDED
Status: DISCONTINUED | OUTPATIENT
Start: 2018-08-14 | End: 2018-08-14 | Stop reason: HOSPADM

## 2018-08-14 RX ORDER — BUPIVACAINE HYDROCHLORIDE AND EPINEPHRINE 5; 5 MG/ML; UG/ML
INJECTION, SOLUTION EPIDURAL; INTRACAUDAL; PERINEURAL AS NEEDED
Status: DISCONTINUED | OUTPATIENT
Start: 2018-08-14 | End: 2018-08-14 | Stop reason: HOSPADM

## 2018-08-14 RX ORDER — HYDROMORPHONE HYDROCHLORIDE 2 MG/ML
0.5 INJECTION, SOLUTION INTRAMUSCULAR; INTRAVENOUS; SUBCUTANEOUS
Status: DISCONTINUED | OUTPATIENT
Start: 2018-08-14 | End: 2018-08-14 | Stop reason: HOSPADM

## 2018-08-14 RX ORDER — SODIUM CHLORIDE 0.9 % (FLUSH) 0.9 %
5-10 SYRINGE (ML) INJECTION EVERY 8 HOURS
Status: DISCONTINUED | OUTPATIENT
Start: 2018-08-14 | End: 2018-08-14 | Stop reason: HOSPADM

## 2018-08-14 RX ORDER — FENTANYL CITRATE 50 UG/ML
50 INJECTION, SOLUTION INTRAMUSCULAR; INTRAVENOUS AS NEEDED
Status: DISCONTINUED | OUTPATIENT
Start: 2018-08-14 | End: 2018-08-14 | Stop reason: HOSPADM

## 2018-08-14 RX ORDER — ISOSULFAN BLUE 50 MG/5ML
2 INJECTION, SOLUTION SUBCUTANEOUS
Status: COMPLETED | OUTPATIENT
Start: 2018-08-14 | End: 2018-08-14

## 2018-08-14 RX ORDER — CLINDAMYCIN PHOSPHATE 900 MG/50ML
INJECTION INTRAVENOUS
Status: COMPLETED
Start: 2018-08-14 | End: 2018-08-14

## 2018-08-14 RX ORDER — LIDOCAINE HYDROCHLORIDE 10 MG/ML
20 INJECTION, SOLUTION EPIDURAL; INFILTRATION; INTRACAUDAL; PERINEURAL
Status: COMPLETED | OUTPATIENT
Start: 2018-08-14 | End: 2018-08-14

## 2018-08-14 RX ORDER — LIDOCAINE HYDROCHLORIDE 10 MG/ML
INJECTION, SOLUTION EPIDURAL; INFILTRATION; INTRACAUDAL; PERINEURAL
Status: DISCONTINUED
Start: 2018-08-14 | End: 2018-08-14 | Stop reason: HOSPADM

## 2018-08-14 RX ORDER — OXYCODONE AND ACETAMINOPHEN 5; 325 MG/1; MG/1
1 TABLET ORAL
Status: COMPLETED | OUTPATIENT
Start: 2018-08-14 | End: 2018-08-14

## 2018-08-14 RX ORDER — SODIUM CHLORIDE, SODIUM LACTATE, POTASSIUM CHLORIDE, CALCIUM CHLORIDE 600; 310; 30; 20 MG/100ML; MG/100ML; MG/100ML; MG/100ML
125 INJECTION, SOLUTION INTRAVENOUS CONTINUOUS
Status: DISCONTINUED | OUTPATIENT
Start: 2018-08-14 | End: 2018-08-14 | Stop reason: HOSPADM

## 2018-08-14 RX ORDER — LIDOCAINE HYDROCHLORIDE 20 MG/ML
INJECTION, SOLUTION EPIDURAL; INFILTRATION; INTRACAUDAL; PERINEURAL AS NEEDED
Status: DISCONTINUED | OUTPATIENT
Start: 2018-08-14 | End: 2018-08-14 | Stop reason: HOSPADM

## 2018-08-14 RX ORDER — HYDROCODONE BITARTRATE AND ACETAMINOPHEN 5; 300 MG/1; MG/1
1 TABLET ORAL
Qty: 20 TAB | Refills: 0 | Status: SHIPPED | OUTPATIENT
Start: 2018-08-14

## 2018-08-14 RX ORDER — CLINDAMYCIN PHOSPHATE 900 MG/50ML
900 INJECTION INTRAVENOUS ONCE
Status: COMPLETED | OUTPATIENT
Start: 2018-08-14 | End: 2018-08-14

## 2018-08-14 RX ORDER — MIDAZOLAM HYDROCHLORIDE 1 MG/ML
INJECTION, SOLUTION INTRAMUSCULAR; INTRAVENOUS AS NEEDED
Status: DISCONTINUED | OUTPATIENT
Start: 2018-08-14 | End: 2018-08-14 | Stop reason: HOSPADM

## 2018-08-14 RX ORDER — FAMOTIDINE 20 MG/1
20 TABLET, FILM COATED ORAL ONCE
Status: COMPLETED | OUTPATIENT
Start: 2018-08-14 | End: 2018-08-14

## 2018-08-14 RX ORDER — FENTANYL CITRATE 50 UG/ML
INJECTION, SOLUTION INTRAMUSCULAR; INTRAVENOUS AS NEEDED
Status: DISCONTINUED | OUTPATIENT
Start: 2018-08-14 | End: 2018-08-14 | Stop reason: HOSPADM

## 2018-08-14 RX ORDER — ONDANSETRON 2 MG/ML
INJECTION INTRAMUSCULAR; INTRAVENOUS AS NEEDED
Status: DISCONTINUED | OUTPATIENT
Start: 2018-08-14 | End: 2018-08-14 | Stop reason: HOSPADM

## 2018-08-14 RX ADMIN — ONDANSETRON 4 MG: 2 INJECTION, SOLUTION INTRAMUSCULAR; INTRAVENOUS at 11:05

## 2018-08-14 RX ADMIN — ISOSULFAN BLUE 2 ML: 10 INJECTION, SOLUTION SUBCUTANEOUS at 08:07

## 2018-08-14 RX ADMIN — FAMOTIDINE 20 MG: 20 TABLET ORAL at 06:35

## 2018-08-14 RX ADMIN — LIDOCAINE HYDROCHLORIDE 50 MG: 20 INJECTION, SOLUTION EPIDURAL; INFILTRATION; INTRACAUDAL; PERINEURAL at 09:20

## 2018-08-14 RX ADMIN — ONDANSETRON 4 MG: 2 INJECTION INTRAMUSCULAR; INTRAVENOUS at 09:33

## 2018-08-14 RX ADMIN — LIDOCAINE HYDROCHLORIDE 10 ML: 10 INJECTION, SOLUTION EPIDURAL; INFILTRATION; INTRACAUDAL; PERINEURAL at 08:07

## 2018-08-14 RX ADMIN — CLINDAMYCIN PHOSPHATE 900 MG: 900 INJECTION INTRAVENOUS at 09:25

## 2018-08-14 RX ADMIN — FENTANYL CITRATE 50 MCG: 50 INJECTION, SOLUTION INTRAMUSCULAR; INTRAVENOUS at 09:47

## 2018-08-14 RX ADMIN — OXYCODONE HYDROCHLORIDE AND ACETAMINOPHEN 1 TABLET: 5; 325 TABLET ORAL at 11:42

## 2018-08-14 RX ADMIN — FENTANYL CITRATE 50 MCG: 50 INJECTION, SOLUTION INTRAMUSCULAR; INTRAVENOUS at 11:00

## 2018-08-14 RX ADMIN — PROPOFOL 150 MG: 10 INJECTION, EMULSION INTRAVENOUS at 09:20

## 2018-08-14 RX ADMIN — FENTANYL CITRATE 50 MCG: 50 INJECTION, SOLUTION INTRAMUSCULAR; INTRAVENOUS at 09:20

## 2018-08-14 RX ADMIN — MIDAZOLAM HYDROCHLORIDE 2 MG: 1 INJECTION, SOLUTION INTRAMUSCULAR; INTRAVENOUS at 09:15

## 2018-08-14 RX ADMIN — FENTANYL CITRATE 50 MCG: 50 INJECTION, SOLUTION INTRAMUSCULAR; INTRAVENOUS at 11:08

## 2018-08-14 RX ADMIN — SODIUM CHLORIDE, SODIUM LACTATE, POTASSIUM CHLORIDE, AND CALCIUM CHLORIDE 25 ML/HR: 600; 310; 30; 20 INJECTION, SOLUTION INTRAVENOUS at 06:35

## 2018-08-14 NOTE — PERIOP NOTES
1049  Patient received in PACU and connected to monitors. Vital signs stable. RN at bedside. Will continue to monitor. 46  Dr. Yolanda Hurst and Dr. Silvina Adams at bedside to talk with pt. Medicating pt per [facility] for c/o pain. 36  Spoke to patient's family (, Leonid Sumner) via phone in waiting area re status update and plan of care. Pt tolerating ice water and saltines, states pain is tolerable.

## 2018-08-14 NOTE — PERIOP NOTES
Received report from [employer], pt sts pain is currently a 4 or 5 now from an 8 (per pt)    Pt and  was given an opportunity for questions and is ready for discarge

## 2018-08-14 NOTE — IP AVS SNAPSHOT
[address] [address] Mara Alexander  
[phone] Patient: Merline Guerra MRN: [id number] About your hospitalization You were admitted on:  August 14, 2018 You last received care in the:  [facility] PHASE 2 RECOVERY You were discharged on:  August 14, 2018 Why you were hospitalized Your primary diagnosis was:  Not on File Follow-up Information Follow up With Details Comments Contact Info Jabier Barrow MD   [address] [place] [id number] 
[phone] Your Scheduled Appointments Friday September 07, 2018  8:00 AM EDT  
RADIATION ONCOLOGY with [facility] RADIATION ONCOLOGY  
[facility] RADIATION THERAPY [address] [place] [address] [facility] [address] [facility] 83 [id number] [phone] ATTENTION: The Appointment Time in [address] may not reflect your scheduled appointment time as the time is only a place soriano. If you have any questions about your appointment time, please call [facility]iation [facility] at [phone]. Discharge Orders None A check renee indicates which time of day the medication should be taken. My Medications START taking these medications Instructions Each Dose to Equal  
 Morning Noon Evening Bedtime HYDROcodone-acetaminophen 5-300 mg tablet Commonly known as:  Shelli Pion Your last dose was: Your next dose is: Take 1 Tab by mouth every four (4) hours as needed. Max Daily Amount: 6 Tabs. 1 Tab CONTINUE taking these medications Instructions Each Dose to Equal  
 Morning Noon Evening Bedtime  
 nortriptyline 10 mg capsule Commonly known as:  PAMELOR Your last dose was: Your next dose is:    
   
   
      
   
   
   
  
  
  
Where to Get Your Medications Information on where to get these meds will be given to you by the nurse or doctor. ! Ask your nurse or doctor about these medications HYDROcodone-acetaminophen 5-300 mg tablet Opioid Education Prescription Opioids: What You Need to Know: 
 
Prescription opioids can be used to help relieve moderate-to-severe pain and are often prescribed following a surgery or injury, or for certain health conditions. These medications can be an important part of treatment but also come with serious risks. Opioids are strong pain medicines. Examples include hydrocodone, oxycodone, fentanyl, and morphine. Heroin is an example of an illegal opioid. It is important to work with your health care provider to make sure you are getting the safest, most effective care. WHAT ARE THE RISKS AND SIDE EFFECTS OF OPIOID USE? Prescription opioids carry serious risks of addiction and overdose, especially with prolonged use. An opioid overdose, often marked by slow breathing, can cause sudden death. The use of prescription opioids can have a number of side effects as well, even when taken as directed. · Tolerance-meaning you might need to take more of a medication for the same pain relief · Physical dependence-meaning you have symptoms of withdrawal when the medication is stopped. Withdrawal symptoms can include nausea, sweating, chills, diarrhea, stomach cramps, and muscle aches. Withdrawal can last up to several weeks, depending on which drug you took and how long you took it. · Increased sensitivity to pain · Constipation · Nausea, vomiting, and dry mouth · Sleepiness and dizziness · Confusion · Depression · Low levels of testosterone that can result in lower sex drive, energy, and strength · Itching and sweating RISKS ARE GREATER WITH:      
· History of drug misuse, substance use disorder, or overdose · Mental health conditions (such as depression or anxiety) · Sleep apnea · Older age (72 years or older) · Pregnancy Avoid alcohol while taking prescription opioids. Also, unless specifically advised by your health care provider, medications to avoid include: · Benzodiazepines (such as Xanax or Valium) · Muscle relaxants (such as Soma or Flexeril) · Hypnotics (such as Ambien or Lunesta) · Other prescription opioids KNOW YOUR OPTIONS Talk to your health care provider about ways to manage your pain that don't involve prescription opioids. Some of these options may actually work better and have fewer risks and side effects. Options may include: 
· Pain relievers such as acetaminophen, ibuprofen, and naproxen · Some medications that are also used for depression or seizures · Physical therapy and exercise · Counseling to help patients learn how to cope better with triggers of pain and stress. · Application of heat or cold compress · Massage therapy · Relaxation techniques Be Informed Make sure you know the name of your medication, how much and how often to take it, and its potential risks & side effects. IF YOU ARE PRESCRIBED OPIOIDS FOR PAIN: 
· Never take opioids in greater amounts or more often than prescribed. Remember the goal is not to be pain-free but to manage your pain at a tolerable level. · Follow up with your primary care provider to: · Work together to create a plan on how to manage your pain. · Talk about ways to help manage your pain that don't involve prescription opioids. · Talk about any and all concerns and side effects. · Help prevent misuse and abuse. · Never sell or share prescription opioids · Help prevent misuse and abuse. · Store prescription opioids in a secure place and out of reach of others (this may include visitors, children, friends, and family).  
· Safely dispose of unused/unwanted prescription opioids: Find your community drug take-back program or your pharmacy mail-back program, or flush them down the toilet, following guidance from the Food and Drug Administration (www.fda.gov/Drugs/ResourcesForYou). · Visit www.cdc.gov/drugoverdose to learn about the risks of opioid abuse and overdose. · If you believe you may be struggling with addiction, tell your health care provider and ask for guidance or call Meek Davison at 0-280-601-SDUB. Discharge Instructions Open Breast Biopsy: What to Expect at Home Your Recovery An open breast biopsy is surgery to take a sample of breast tissue. A breast biopsy may be done to check a lump found during a breast exam. Or it may be done to check an area of concern found on a mammogram or ultrasound. The breast tissue will be sent to a lab, where a doctor will look at the tissue under a microscope to check for breast cancer. Your doctor may have some answers right away. But it can take up to 1 to 2 weeks to get the final results. Your doctor will discuss the results with you. For a few days after the surgery, you will probably feel tired and have some pain. The skin around the cut (incision) may feel firm, swollen, and tender. The area may be bruised. Tenderness usually goes away in a few days, and the bruising within 2 weeks. Firmness and swelling may take 3 to 6 months to go away. The stitches in your incision may dissolve on their own, or the doctor may take them out 7 to 10 days after surgery. This care sheet gives you a general idea about how long it will take for you to recover. But each person recovers at a different pace. Follow the steps below to get better as quickly as possible. How can you care for yourself at home? Activity 
  · Rest when you feel tired. Getting enough sleep will help you recover.  
  · Try to walk each day. Start by walking a little more than you did the day before. Bit by bit, increase the amount you walk. Walking boosts blood flow and helps prevent pneumonia and constipation.   · For 2 weeks, avoid strenuous activities that put pressure on your chest or that involve vigorous movement of your upper body and arm on the side of the biopsy. Examples of these might include strenuous housework, holding an active child, jogging, or aerobic exercise.  
  · For 2 weeks, avoid lifting anything that would make you strain. This may include heavy grocery bags and milk containers, a heavy briefcase or backpack, cat litter or dog food bags, a vacuum , or a child.  
  · Ask your doctor when you can drive again.  
  · You will probably need to take 1 or 2 days off from work. This depends on the type of work you do and how you feel. Diet 
  · You can eat your normal diet. If your stomach is upset, try bland, low-fat foods like plain rice, broiled chicken, toast, and yogurt. Medicines 
  · Your doctor will tell you if and when you can restart your medicines. He or she will also give you instructions about taking any new medicines.  
  · If you take blood thinners, such as warfarin (Coumadin), clopidogrel (Plavix), or aspirin, be sure to talk to your doctor. He or she will tell you if and when to start taking those medicines again. Make sure that you understand exactly what your doctor wants you to do.  
  · Be safe with medicines. Take pain medicines exactly as directed. ¨ If the doctor gave you a prescription medicine for pain, take it as prescribed. ¨ If you are not taking a prescription pain medicine, ask your doctor if you can take an over-the-counter medicine.  
  · If you think your pain medicine is making you sick to your stomach: 
¨ Take your medicine after meals (unless your doctor has told you not to). ¨ Ask your doctor for a different pain medicine.  
  · If your doctor prescribed antibiotics, take them as directed. Do not stop taking them just because you feel better. You need to take the full course of antibiotics. Incision care   · If you have strips of tape on the incision, leave the tape on for a week or until it falls off.  
  · Wash the area daily with warm, soapy water, and pat it dry. Don't use hydrogen peroxide or alcohol, which can slow healing. You may cover the area with a gauze bandage if it weeps or rubs against clothing. Change the bandage every day.  
  · Keep the area clean and dry. Other instructions 
  · For the first 3 days after surgery, wear a supportive bra all the time, even at night. Follow-up care is a key part of your treatment and safety. Be sure to make and go to all appointments, and call your doctor if you are having problems. It's also a good idea to know your test results and keep a list of the medicines you take. When should you call for help? Call 911 anytime you think you may need emergency care. For example, call if: 
  · You passed out (lost consciousness).  
  · You have chest pain, are short of breath, or cough up blood.  
 Call your doctor now or seek immediate medical care if: 
  · You are sick to your stomach or cannot drink fluids.  
  · You have pain that does not get better after you take pain medicine.  
  · You cannot pass stools or gas.  
  · You have signs of a blood clot in your leg (called a deep vein thrombosis), such as: 
¨ Pain in your calf, back of the knee, thigh, or groin. ¨ Redness or swelling in your leg.  
  · You have signs of infection, such as: 
¨ Increased pain, swelling, warmth, or redness. ¨ Red streaks leading from the incision. ¨ Pus draining from the incision. ¨ A fever.  
  · You have loose stitches, or your incision comes open.  
  · Bright red blood has soaked through the bandage over your incision.  
 Watch closely for changes in your health, and be sure to contact your doctor if: 
  · You have any problems.  
  · You have new or worse swelling or pain in your arm. Where can you learn more? Go to http://christen-rj.info/. Enter T479 in the search box to learn more about \"Open Breast Biopsy: What to Expect at Home. \" Current as of: May 12, 2017 Content Version: 11.7 © 8947-4867 [employer]. Care instructions adapted under license by [employer] (which disclaims liability or warranty for this information). If you have questions about a medical condition or this instruction, always ask your healthcare professional. [address] [address] any warranty or liability for your use of this information. DISCHARGE SUMMARY from Nurse PATIENT INSTRUCTIONS: 
 
After general anesthesia or intravenous sedation, for 24 hours or while taking prescription Narcotics: · Limit your activities · Do not drive and operate hazardous machinery · Do not make important personal or business decisions · Do  not drink alcoholic beverages · If you have not urinated within 8 hours after discharge, please contact your surgeon on call. Report the following to your surgeon: 
· Excessive pain, swelling, redness or odor of or around the surgical area · Temperature over 100.5 · Nausea and vomiting lasting longer than 4 hours or if unable to take medications · Any signs of decreased circulation or nerve impairment to extremity: change in color, persistent  numbness, tingling, coldness or increase pain · Any questions What to do at Home: These are general instructions for a healthy lifestyle: No smoking/ No tobacco products/ Avoid exposure to second hand smoke Surgeon General's Warning:  Quitting smoking now greatly reduces serious risk to your health. Obesity, smoking, and sedentary lifestyle greatly increases your risk for illness A healthy diet, regular physical exercise & weight monitoring are important for maintaining a healthy lifestyle You may be retaining fluid if you have a history of heart failure or if you experience any of the following symptoms:  Weight gain of 3 pounds or more overnight or 5 pounds in a week, increased swelling in our hands or feet or shortness of breath while lying flat in bed. Please call your doctor as soon as you notice any of these symptoms; do not wait until your next office visit. Recognize signs and symptoms of STROKE: 
 
F-face looks uneven A-arms unable to move or move unevenly S-speech slurred or non-existent T-time-call 911 as soon as signs and symptoms begin-DO NOT go Back to bed or wait to see if you get better-TIME IS BRAIN. Warning Signs of HEART ATTACK Call 911 if you have these symptoms: 
? Chest discomfort. Most heart attacks involve discomfort in the center of the chest that lasts more than a few minutes, or that goes away and comes back. It can feel like uncomfortable pressure, squeezing, fullness, or pain. ? Discomfort in other areas of the upper body. Symptoms can include pain or discomfort in one or both arms, the back, neck, jaw, or stomach. ? Shortness of breath with or without chest discomfort. ? Other signs may include breaking out in a cold sweat, nausea, or lightheadedness. Don't wait more than five minutes to call [address]! Fast action can save your life. Calling 911 is almost always the fastest way to get lifesaving treatment. Emergency Medical Services staff can begin treatment when they arrive  up to an hour sooner than if someone gets to the hospital by car. The discharge information has been reviewed with the patient. The patient verbalized understanding. Discharge medications reviewed with the patient and appropriate educational materials and side effects teaching were provided. _____________________________________________________________________________________________________________________Patient armband removed and given to patient to take home. Patient was informed of the privacy risks if armband lost or stolen 
______________ [facility]! [facility] introduces Lelat patient portal. Now you can access parts of your medical record, email your doctor's office, and request medication refills online. 1. In your internet browser, go to [email or web address] 2. Click on the First Time User? Click Here link in the Sign In box. You will see the New Member Sign Up page. 3. Enter your Parachute Access Code exactly as it appears below. You will not need to use this code after youve completed the sign-up process. If you do not sign up before the expiration date, you must request a new code. · Parachute Access Code: [email or web address] Expires: 10/8/2018 10:12 AM 
 
4. Enter the last four digits of your Social Security Number (xxxx) and Date of Birth (mm/dd/yyyy) as indicated and click Submit. You will be taken to the next sign-up page. 5. Create a Parachute ID. This will be your Parachute login ID and cannot be changed, so think of one that is secure and easy to remember. 6. Create a Parachute password. You can change your password at any time. 7. Enter your Password Reset Question and Answer. This can be used at a later time if you forget your password. 8. Enter your e-mail address. You will receive e-mail notification when new information is available in [address] 9. Click Sign Up. You can now view and download portions of your medical record. 10. Click the Download Summary menu link to download a portable copy of your medical information. If you have questions, please visit the Frequently Asked Questions section of the Parachute website. Remember, Parachute is NOT to be used for urgent needs. For medical emergencies, dial 911. Now available from your iPhone and Android! Introducing Giorgi Brumfield As a [facility] patient, I wanted to make you aware of our electronic visit tool called Giorgi Brumfield. [facility] 24/7 allows you to connect within minutes with a medical provider 24 hours a day, seven days a week via a mobile device or tablet or logging into a secure website from your computer. You can access [employer] from anywhere in the [place]. A virtual visit might be right for you when you have a simple condition and feel like you just dont want to get out of bed, or cant get away from work for an appointment, when your regular [employer] provider is not available (evenings, weekends or holidays), or when youre out of town and need minor care. Electronic visits cost only $49 and if the [employer] 24/7 provider determines a prescription is needed to treat your condition, one can be electronically transmitted to a nearby pharmacy*. Please take a moment to enroll today if you have not already done so. The enrollment process is free and takes just a few minutes. To enroll, please download the [employer] 24/7 janene to your tablet or phone, or visit [email or web address] to enroll on your computer. And, as an [address] [place] [address] patient with a [employer] account, the results of your visits will be scanned into your electronic medical record and your primary care provider will be able to view the scanned results. We urge you to continue to see your regular [employer] provider for your ongoing medical care. And while your primary care provider may not be the one available when you seek a Giorgi Sebastieneran virtual visit, the peace of mind you get from getting a real diagnosis real time can be priceless. For more information on [employer], view our Frequently Asked Questions (FAQs) at [email or web address]. Sincerely, 
 
Edmundo Roberts MD 
Chief Medical Officer [employer] *:  certain medications cannot be prescribed via Giorgi epacubeeran Providers Seen During Your Hospitalization Provider Specialty Primary office phone Rakel Brown, [address] [facility] [phone] Your Primary Care Physician (PCP) Primary Care Physician Office Phone Office Fax [address], [address] [phone] You are allergic to the following Allergen Reactions Pcn (Penicillins) Rash Hives with PCN G benzathine Recent Documentation Height Weight BMI OB Status Smoking Status 1.778 m 67.1 kg 21.24 kg/m2 Hysterectomy Current Every Day Smoker Emergency Contacts Name Discharge Info Relation Home Work Mobile Ronny Weiss DISCHARGE CAREGIVER [3] Spouse [3] [phone] Patient Belongings The following personal items are in your possession at time of discharge: 
  Dental Appliances: None  Visual Aid: Glasses      Home Medications: None   Jewelry: None  Clothing: Undergarments, Pants, Shirt, Footwear    Other Valuables: Eyeglasses Please provide this summary of care documentation to your next provider. Signatures-by signing, you are acknowledging that this After Visit Summary has been reviewed with you and you have received a copy. Patient Signature:  ____________________________________________________________ Date:  ____________________________________________________________  
  
Ismael Diaz Provider Signature:  ____________________________________________________________ Date:  ____________________________________________________________

## 2018-08-14 NOTE — PROGRESS NOTES
conducted a pre-surgery visit with Radha Salter, who is a 66 y.o.,female. The  provided the following Interventions:  Initiated a relationship of care and support. Offered prayer and assurance of continued prayers on patient's behalf. Plan:  Chaplains will continue to follow and will provide pastoral care on an as needed/requested basis.  recommends bedside caregivers page  on duty if patient shows signs of acute spiritual or emotional distress.     Sterling Morales   Spiritual Care   [phone]

## 2018-08-14 NOTE — DISCHARGE INSTRUCTIONS
Open Breast Biopsy: What to Expect at Home  Your Recovery  An open breast biopsy is surgery to take a sample of breast tissue. A breast biopsy may be done to check a lump found during a breast exam. Or it may be done to check an area of concern found on a mammogram or ultrasound. The breast tissue will be sent to a lab, where a doctor will look at the tissue under a microscope to check for breast cancer. Your doctor may have some answers right away. But it can take up to 1 to 2 weeks to get the final results. Your doctor will discuss the results with you. For a few days after the surgery, you will probably feel tired and have some pain. The skin around the cut (incision) may feel firm, swollen, and tender. The area may be bruised. Tenderness usually goes away in a few days, and the bruising within 2 weeks. Firmness and swelling may take 3 to 6 months to go away. The stitches in your incision may dissolve on their own, or the doctor may take them out 7 to 10 days after surgery. This care sheet gives you a general idea about how long it will take for you to recover. But each person recovers at a different pace. Follow the steps below to get better as quickly as possible. How can you care for yourself at home? Activity    · Rest when you feel tired. Getting enough sleep will help you recover.     · Try to walk each day. Start by walking a little more than you did the day before. Bit by bit, increase the amount you walk. Walking boosts blood flow and helps prevent pneumonia and constipation.     · For 2 weeks, avoid strenuous activities that put pressure on your chest or that involve vigorous movement of your upper body and arm on the side of the biopsy. Examples of these might include strenuous housework, holding an active child, jogging, or aerobic exercise.     · For 2 weeks, avoid lifting anything that would make you strain.  This may include heavy grocery bags and milk containers, a heavy briefcase or backpack, cat litter or dog food bags, a vacuum , or a child.     · Ask your doctor when you can drive again.     · You will probably need to take 1 or 2 days off from work. This depends on the type of work you do and how you feel. Diet    · You can eat your normal diet. If your stomach is upset, try bland, low-fat foods like plain rice, broiled chicken, toast, and yogurt. Medicines    · Your doctor will tell you if and when you can restart your medicines. He or she will also give you instructions about taking any new medicines.     · If you take blood thinners, such as warfarin (Coumadin), clopidogrel (Plavix), or aspirin, be sure to talk to your doctor. He or she will tell you if and when to start taking those medicines again. Make sure that you understand exactly what your doctor wants you to do.     · Be safe with medicines. Take pain medicines exactly as directed. ¨ If the doctor gave you a prescription medicine for pain, take it as prescribed. ¨ If you are not taking a prescription pain medicine, ask your doctor if you can take an over-the-counter medicine.     · If you think your pain medicine is making you sick to your stomach:  ¨ Take your medicine after meals (unless your doctor has told you not to). ¨ Ask your doctor for a different pain medicine.     · If your doctor prescribed antibiotics, take them as directed. Do not stop taking them just because you feel better. You need to take the full course of antibiotics. Incision care    · If you have strips of tape on the incision, leave the tape on for a week or until it falls off.     · Wash the area daily with warm, soapy water, and pat it dry. Don't use hydrogen peroxide or alcohol, which can slow healing. You may cover the area with a gauze bandage if it weeps or rubs against clothing. Change the bandage every day.     · Keep the area clean and dry.    Other instructions    · For the first 3 days after surgery, wear a supportive bra all the time, even at night. Follow-up care is a key part of your treatment and safety. Be sure to make and go to all appointments, and call your doctor if you are having problems. It's also a good idea to know your test results and keep a list of the medicines you take. When should you call for help? Call 911 anytime you think you may need emergency care. For example, call if:    · You passed out (lost consciousness).     · You have chest pain, are short of breath, or cough up blood.    Call your doctor now or seek immediate medical care if:    · You are sick to your stomach or cannot drink fluids.     · You have pain that does not get better after you take pain medicine.     · You cannot pass stools or gas.     · You have signs of a blood clot in your leg (called a deep vein thrombosis), such as:  ¨ Pain in your calf, back of the knee, thigh, or groin. ¨ Redness or swelling in your leg.     · You have signs of infection, such as:  ¨ Increased pain, swelling, warmth, or redness. ¨ Red streaks leading from the incision. ¨ Pus draining from the incision. ¨ A fever.     · You have loose stitches, or your incision comes open.     · Bright red blood has soaked through the bandage over your incision.    Watch closely for changes in your health, and be sure to contact your doctor if:    · You have any problems.     · You have new or worse swelling or pain in your arm. Where can you learn more? Go to http://christen-rj.info/. Enter M618 in the search box to learn more about \"Open Breast Biopsy: What to Expect at Home. \"  Current as of: May 12, 2017  Content Version: 11.7  © 4309-7036 [employer]. Care instructions adapted under license by [employer] (which disclaims liability or warranty for this information).  If you have questions about a medical condition or this instruction, always ask your healthcare professional. [employer] disclaims any warranty or liability for your use of this information. DISCHARGE SUMMARY from Nurse    PATIENT INSTRUCTIONS:    After general anesthesia or intravenous sedation, for 24 hours or while taking prescription Narcotics:  · Limit your activities  · Do not drive and operate hazardous machinery  · Do not make important personal or business decisions  · Do  not drink alcoholic beverages  · If you have not urinated within 8 hours after discharge, please contact your surgeon on call. Report the following to your surgeon:  · Excessive pain, swelling, redness or odor of or around the surgical area  · Temperature over 100.5  · Nausea and vomiting lasting longer than 4 hours or if unable to take medications  · Any signs of decreased circulation or nerve impairment to extremity: change in color, persistent  numbness, tingling, coldness or increase pain  · Any questions    What to do at Home:    These are general instructions for a healthy lifestyle:    No smoking/ No tobacco products/ Avoid exposure to second hand smoke  Surgeon General's Warning:  Quitting smoking now greatly reduces serious risk to your health. Obesity, smoking, and sedentary lifestyle greatly increases your risk for illness    A healthy diet, regular physical exercise & weight monitoring are important for maintaining a healthy lifestyle    You may be retaining fluid if you have a history of heart failure or if you experience any of the following symptoms:  Weight gain of 3 pounds or more overnight or 5 pounds in a week, increased swelling in our hands or feet or shortness of breath while lying flat in bed. Please call your doctor as soon as you notice any of these symptoms; do not wait until your next office visit.     Recognize signs and symptoms of STROKE:    F-face looks uneven    A-arms unable to move or move unevenly    S-speech slurred or non-existent    T-time-call 911 as soon as signs and symptoms begin-DO NOT go       Back to bed or wait to see if you get better-TIME IS BRAIN. Warning Signs of HEART ATTACK     Call 911 if you have these symptoms:   Chest discomfort. Most heart attacks involve discomfort in the center of the chest that lasts more than a few minutes, or that goes away and comes back. It can feel like uncomfortable pressure, squeezing, fullness, or pain.  Discomfort in other areas of the upper body. Symptoms can include pain or discomfort in one or both arms, the back, neck, jaw, or stomach.  Shortness of breath with or without chest discomfort.  Other signs may include breaking out in a cold sweat, nausea, or lightheadedness. Don't wait more than five minutes to call 911 - MINUTES MATTER! Fast action can save your life. Calling 911 is almost always the fastest way to get lifesaving treatment. Emergency Medical Services staff can begin treatment when they arrive -- up to an hour sooner than if someone gets to the hospital by car. The discharge information has been reviewed with the patient. The patient verbalized understanding. Discharge medications reviewed with the patient and appropriate educational materials and side effects teaching were provided. _____________________________________________________________________________________________________________________Patient armband removed and given to patient to take home.   Patient was informed of the privacy risks if armband lost or stolen  ______________

## 2018-08-14 NOTE — ANESTHESIA PREPROCEDURE EVALUATION
Anesthetic History   No history of anesthetic complications            Review of Systems / Medical History  Patient summary reviewed and pertinent labs reviewed    Pulmonary  Within defined limits                 Neuro/Psych   Within defined limits           Cardiovascular  Within defined limits                Exercise tolerance: >4 METS     GI/Hepatic/Renal  Within defined limits              Endo/Other        Cancer     Other Findings   Comments: Documentation of current medication  Current medications obtained, documented and obtained? YES      Risk Factors for Postoperative nausea/vomiting:       History of postoperative nausea/vomiting? NO       Female? YES       Motion sickness? NO       Intended opioid administration for postoperative analgesia? YES      Smoking Abstinence:  Current Smoker? NO  Elective Surgery? YES  Seen preoperatively by anesthesiologist or proxy prior to day of surgery? YES  Pt abstained from smoking 24 hours prior to anesthesia?  N/A    Preventive care/screening for High Blood Pressure:  Aged 18 years and older: YES  Screened for high blood pressure: YES  Patients with high blood pressure referred to primary care provider   for BP management: YES                 Physical Exam    Airway  Mallampati: II  TM Distance: 4 - 6 cm  Neck ROM: normal range of motion   Mouth opening: Normal     Cardiovascular    Rhythm: regular  Rate: normal         Dental  No notable dental hx       Pulmonary  Breath sounds clear to auscultation               Abdominal  GI exam deferred       Other Findings            Anesthetic Plan    ASA: 3  Anesthesia type: general          Induction: Intravenous  Anesthetic plan and risks discussed with: Patient

## 2018-08-14 NOTE — H&P (VIEW-ONLY)
History of present illness    Jane Jung comes to the office today for newly diagnosed right breast cancer. The patient had a recent right mammogram that showed 2 spiculated areas in the lateral right breast at the 8 o'clock position 4 cm from the nipple and at the 10 o'clock position 7 cm from the nipple. Stereotactic biopsies of each of these were performed and both of these showed invasive ductal carcinoma apparently grade 2. It appears that they are both estrogen and progestin receptor positive but HER-2 negative. I have personally reviewed the patient's mammograms and discussed them with the radiologist.  The patient had been previously told her diagnosis by the radiologist.    The patient's menarche occurred at the age of 17. She had a hysterectomy in  because of heavy menstrual bleeding. She started having some mild hot flashes and believes she went through hormonal menopause in about . She did take birth control pills off and on for most 25 years. She is  4 para 4 and was age 27 at the time of her first pregnancy. She did not breast-feed. Her mother developed breast cancer at the age of 76 her paternal grandmother at the age of 76. She may have had a maternal great aunt also with breast cancer at the age of 76. There is no family history of ovarian cancer.     Allergies   Allergen Reactions    Pcn [Penicillins] Rash     Hives with PCN G benzathine     Past Medical History:   Diagnosis Date    Anemia NEC     Fibroids 3/12/2013    Pelvic pain 3/12/2013    Restless leg syndrome      Past Surgical History:   Procedure Laterality Date    HX BREAST BIOPSY Right 2018    HX  SECTION  [id number]    x3    HX HYSTERECTOMY      HX TUBAL LIGATION       Social History     Social History    Marital status:      Spouse name: N/A    Number of children: N/A    Years of education: N/A     Social History Main Topics    Smoking status: Current Every Day Smoker     Packs/day: 0.50     Types: Cigarettes    Smokeless tobacco: Never Used    Alcohol use No    Drug use: No    Sexual activity: Yes     Partners: Male      Comment: tubal     Other Topics Concern    None     Social History Narrative     REVIEW OF SYSTEMS     Constitutional: No fever, weight loss, fatigue or recent chills. Skin:  No recent rashes, dermatitis or abnormal moles. HEENT:  No changes in vision, vertigo, epistaxis, dysphasia, or hoarseness. Cardiac:  No chest pain, palpitations, or edema. Respiratory: No chronic cough, shortness of breath, wheezing, hemoptysis, or history of sleep apnea. Breasts/GYN:  No history of recent breast lumps or nipple discharge. Mammograms are up to date. No GYN-type problems. See the history of present illness     Gastrointestinal:  No significant food intolerances, no recent vomiting, no chronic abdominal pain, no change in bowel habits, no melena. No history of GERD. Genitourinary:  No history of hematuria, dysuria, frequency, or stress urinary incontinence. No nocturia. Musculoskeletal: No weakness, joint pains, or arthritis. Endocrine:  No history of thyroid disease. No diabetes. Lymph/hemo:  No history of blood transfusions or easy bruising. History of blood loss anemia secondary to big fibroids. Neuro: No dizziness or headaches or fainting. Visit Vitals    /60 (BP 1 Location: Right arm, BP Patient Position: Sitting)    Pulse 84    Temp 97.5 °F (36.4 °C) (Oral)    Resp 14    Ht 5' 10\" (1.778 m)    Wt 67.1 kg (148 lb)    SpO2 98%    BMI 21.24 kg/m2   PHYSICAL EXAM     Constitutional:  Well-developed, well-nourished, no acute distress. Head:  Head, eyes, ears, nose, throat within normal limits. Skin:  No suspicious moles or rashes. Neck:  No masses or adenopathy. The airway appears normal. Thyroid is not enlarged and there are no palpable thyroid nodules.      Lungs:  Lungs are clear to auscultation and percussion. No respiratory distress. No chest wall tenderness. Heart:  Heart is regular with no extra heart sounds or murmur heard. Breast Exam: The breasts are free of any discrete tenderness or masses except in the area where the patient had the recent biopsies of the lateral right breast.  The skin and nipple areas appear normal. Both axillae are negative. However the patient does have palpable nodular areas in the lateral right breast located in the 8 o'clock position almost 6 cm from the nipple in the 10 o'clock position 8 cm from the nipple. There is some bruising in this area to from the previous biopsies. It is unclear whether the palpable masses are small hematomas or the actual tumors that I am feeling. Abdomen: The abdomen is soft and nontender without organomegaly or masses. Bowel sounds are active and of normal pitch. There is no abdominal distention. No hernias are evident. Extremities:  No tenderness of the extremities and no significant swelling. Psych:  Alert and oriented. Assessment: Invasive ductal carcinoma in 2 areas of the right breast at 8:00 6 cm from the nipple and 10:00 8 cm from the nipple. Tumors are estrogen and progesterone receptor positive but HER-2 negative. On mammogram the mass in the 8 o'clock position measures about 6 mm in the 10 o'clock position about 2.2 cm. #2 history of anemia. Recommendations: I had a long discussion with the patient and her family. Options for treatment were discussed. I did recommend that she have an MRI to further evaluate these 2 lesions and their relationship. I do believe that the patient could probably have both lumps removed through a single incision but did state that this would certainly take out significant tissue from the breast and she would notice a difference in size. But a lumpectomy with subsequent radiation therapy presently would be my recommendation.   I have asked that she see the radiotherapist for their consultation and recommendations. Patient wishes to proceed soon with the surgery so we will see her back after accomplishing the above. The above was dictated with Kate. There may be unrecognized errors.     Wil Montana MD

## 2018-08-14 NOTE — NURSE NAVIGATOR
7:00 am. Pre op visit. Pt did not sleep well last pm. Understands procedures and what to expect post op. Post op pillow given. Numerous family members here for support. All questions answered.

## 2018-08-14 NOTE — INTERVAL H&P NOTE
H&P Update:  Shawna Malcolm was seen and examined. History and physical has been reviewed. The patient has been examined.  There have been no significant clinical changes since the completion of the originally dated History and Physical.    Signed By: Erin Zamudio MD     August 14, 2018 9:02 AM

## 2018-08-14 NOTE — PERIOP NOTES
Phase 2 Recovery Summary  Report received from [facility] [place], RN  Vitals:    08/14/18 1059 08/14/18 1104 08/14/18 1119 08/14/18 1129   BP: 90/72 109/70 119/74 106/72   Pulse: 73 72 71 68   Resp: 19 18 15 13   Temp:       SpO2: 100% 100% 96% 95%   Weight:       Height:           oriented to time, place, person and situation    Lines and Drains  Peripheral Intravenous Line:   Peripheral IV 08/14/18 Left Hand (Active)   Site Assessment Clean, dry, & intact 8/14/2018 11:19 AM   Phlebitis Assessment 0 8/14/2018 11:19 AM   Infiltration Assessment 0 8/14/2018 11:19 AM   Dressing Status Clean, dry, & intact 8/14/2018 11:19 AM   Dressing Type Transparent;Tape 8/14/2018 11:19 AM   Hub Color/Line Status Pink; Infusing 8/14/2018 11:19 AM       Wound  Wound Breast Right (Active)   DRESSING STATUS Clean, dry, and intact 8/14/2018 11:19 AM   DRESSING TYPE Adhesive wound closure strips (Steri-Strips); Transparent film 8/14/2018 11:19 AM   Number of days:0       Wound Axilla Right (Active)   DRESSING STATUS Clean, dry, and intact 8/14/2018 11:19 AM   DRESSING TYPE Topical skin adhesive/glue 8/14/2018 11:19 AM   Number of days:0              Patient discharged to home     [facility]

## 2018-08-14 NOTE — OP NOTES
[facility]  OPERATIVE REPORT    Doris Posadas  MR#: [id number]  : 1960  ACCOUNT #: [de-identified]   DATE OF SERVICE: 2018    PREOPERATIVE DIAGNOSIS:  Invasive ductal carcinoma, right breast, 2 locations, 8 and 10 o'clock position, connected by stranding on MRI. POSTOPERATIVE DIAGNOSES:    1. Invasive ductal carcinoma, right breast, 2 locations, 8 and 10 o'clock position, connected by stranding on MRI. Await path report. 2.  Two sentinel nodes were found and were negative on frozen section. PROCEDURE PERFORMED:  Right breast partial mastectomy with 2 wire localizations of the 2 tumors and sentinel node biopsy. SURGEON:  Dee Dee Henry MD    ASSISTANT:  Ilir Wasserman MD    ANESTHESIA:  General LMA plus 0.5% Marcaine with epinephrine 20 mL. ESTIMATED BLOOD LOSS:  15 mL    SPECIMENS REMOVED:  Breast cancers with localization wires, right lateral breast, marked with a short stitch superior, a long stitch laterally, and sentinel lymph nodes which were blue and \"hot\", 2 in number. IMPLANTS:  3 x 1 cm BioZorb device. COMPLICATIONS:  None. OPERATIVE FINDINGS:  The patient is a 57-year-old female who recently on mammogram was found to have 2 suspicious areas in the right breast, one at 8 o'clock and another at 10 o'clock. The 8 o'clock lesion was a bit more superficial and the 10 o'clock lesion a bit deeper. Both of these were biopsied and found to be invasive ductal carcinoma, both hormone receptor positive, but HER-2 negative. Clinically, the patient did not have enlarged axillary nodes. Decision was made to do a lumpectomy, removing both lumps in the same specimen including the tissue between the two because of concern that there may be some tumor connecting the two. OPERATIVE PROCEDURE:  The patient had localization of the 2 lesions in radiology and she had injection of the technetium 99 in nuclear medicine.   She was brought to the operating room with the breast having been marked. She was placed under general anesthesia. She received 900 mg of Cleocin preop. After she was asleep, isosulfan blue 5 mL was injected at the edge of the areola in about the 9 o'clock position. The patient was appropriately positioned. SCDs were in place. Mary Opitz was then placed. She was prepped and draped in the appropriate manner. Another timeout was performed, having done one before the injection of the isosulfan blue. Then, using the Neoprobe, the area of maximum count in the axilla was marked, injected the skin with Marcaine, made a small incision, extended down through the subcutaneous tissue, through the superficial fascia where we found fairly superficially 2 sentinel nodes which were blue and hot. After these were removed, the count in the axilla went way down. These were sent for frozen section. The axilla appeared dry. We then turned our attention to the breast and made a slightly curvilinear oblique incision between the 2 localized lumps, extended down through the superficial tissue and then excised medially and laterally and superiorly and inferiorly around the area of the localization wires and down to the chest wall. Specimen was sent for specimen mammogram.  It came back showing that it appeared that the wires and areas of concern were in the specimen. The path report came back on the frozen section of the sentinel nodes and they came back negative. We then placed a 3 x 1 cm BioZorb device on the chest wall and sutured it into place to help localize the area of the tumors. The breast tissue was closed over that. The axilla was closed with interrupted 3-0 Vicryl as was the breast.  Then, a Monocryl 4-0 was used to close the skin of the axilla and a 4-0 Prolene was used to close the skin of the breast.  Steri-Strips were applied to that, a sterile dressing.   Patient awakened and taken to recovery room in satisfactory condition.       MD ANGELO GalvanI / JOLANTA  D: 08/14/2018 10:42     T: 08/14/2018 16:48  JOB #: [id number]

## 2018-08-14 NOTE — ANESTHESIA POSTPROCEDURE EVALUATION
Post-Anesthesia Evaluation and Assessment    Patient: Bryon Combs MRN: [id number]  SSN: [id number]    YOB: 1960  Age: 66 y.o. Sex: female       Cardiovascular Function/Vital Signs  Visit Vitals    /52 (BP 1 Location: Left arm, BP Patient Position: At rest)    Pulse 66    Temp 36.1 °C (97 °F)    Resp 16    Ht 5' 10\" (1.778 m)    Wt 67.1 kg (148 lb)    SpO2 96%    BMI 21.24 kg/m2       Patient is status post general anesthesia for Procedure(s):  RIGHT BREAST LUMPECTOMY WITH SLN BX POSSIBLE BIOSORB POSSIBLE COMPLETION WITH AXILLARY NODE DISSECTION AND LOCALIZATION OF BOTH LUMPS. Nausea/Vomiting: None    Postoperative hydration reviewed and adequate. Pain:  Pain Scale 1: Numeric (0 - 10) (08/14/18 1153)  Pain Intensity 1: 2 (08/14/18 1153)   Managed    Neurological Status:   Neuro (WDL): Within Defined Limits (08/14/18 1119)  Neuro  Neurologic State: Drowsy; Alert (08/14/18 1119)  Orientation Level: Oriented X4 (08/14/18 1119)   At baseline    Mental Status and Level of Consciousness: Arousable    Pulmonary Status:   O2 Device: Room air (08/14/18 1119)   Adequate oxygenation and airway patent    Complications related to anesthesia: None    Post-anesthesia assessment completed.  No concerns    Signed By: Luis Rouse MD     August 14, 2018

## 2018-08-14 NOTE — BRIEF OP NOTE
BRIEF OPERATIVE NOTE    Date of Procedure: 8/14/2018   Preoperative Diagnosis: C50.911, right breast cancer  Postoperative Diagnosis: C50.911    Procedure(s):  RIGHT BREAST LUMPECTOMY WITH SLN BX, BIOSORB PLACEMENT AND LOCALIZATION OF BOTH LUMPS, injection of blue dye right   Surgeon(s) and Role:     * Olga Ruff MD - Primary     * Peggy Watters MD         Surgical Assistant: Carmelo Olvera    Surgical Staff:  Circ-1: Zuleima Vega RN  Circ-2: Justin Soto RN  Scrub Tech-1: Nikia Burch  Surg Asst-1: Hilda Ervin  Surg Asst-2: Vaibhav Gomez  Event Time In   Incision Start 0940   Incision Close 1045     Anesthesia: General   Estimated Blood Loss: 5cc  Specimens:   ID Type Source Tests Collected by Time Destination   1 : 1st [place] Node Frozen Section Lymph Node  Olga Ruff MD 8/14/2018 1003 Pathology   2 : 2nd [place] Node Frozen Section Lymph Node  Olga Ruff MD 8/14/2018 1004 Pathology      Findings: sentinel nodes negative per frozen, clips and wires x2 in specimen radiograph   Complications: none noted   Implants:   Implant Name Type Inv.  Item Serial No.  Lot No. LRB No. Used Action   [employer]      [id number]   [id number] Right 1 Implanted 45

## 2018-08-15 ENCOUNTER — TELEPHONE (OUTPATIENT)
Dept: OTHER | Age: 58
End: 2018-08-15

## 2018-08-15 NOTE — TELEPHONE ENCOUNTER
Post hospital discharge f/u call to pt. Pt states she is \"OK\". She is having extreme soreness and pain. She is taking her pain medications to \"take the edge off\". No nausea. Reminded her to schedule her f/u ov with Dr Kaylynn Mendiola for next week. All questions answered.

## 2018-08-21 ENCOUNTER — NURSE NAVIGATOR (OUTPATIENT)
Dept: OTHER | Age: 58
End: 2018-08-21

## 2018-08-21 ENCOUNTER — OFFICE VISIT (OUTPATIENT)
Dept: SURGERY | Age: 58
End: 2018-08-21

## 2018-08-21 VITALS
HEART RATE: 93 BPM | WEIGHT: 148.4 LBS | SYSTOLIC BLOOD PRESSURE: 120 MMHG | OXYGEN SATURATION: 97 % | BODY MASS INDEX: 21.24 KG/M2 | HEIGHT: 70 IN | DIASTOLIC BLOOD PRESSURE: 56 MMHG | TEMPERATURE: 96.9 F | RESPIRATION RATE: 16 BRPM

## 2018-08-21 DIAGNOSIS — C50.411 MALIGNANT NEOPLASM OF UPPER-OUTER QUADRANT OF RIGHT BREAST IN FEMALE, ESTROGEN RECEPTOR POSITIVE (HCC): Primary | ICD-10-CM

## 2018-08-21 DIAGNOSIS — Z17.0 MALIGNANT NEOPLASM OF UPPER-OUTER QUADRANT OF RIGHT BREAST IN FEMALE, ESTROGEN RECEPTOR POSITIVE (HCC): Primary | ICD-10-CM

## 2018-08-21 RX ORDER — ACETAMINOPHEN 500 MG
TABLET ORAL
COMMUNITY

## 2018-08-21 NOTE — PROGRESS NOTES
SUBJECTIVE: Jermain Blue is a 66 y.o.  female is seen for a routine postop check. Reports no problems with the wound or other issues. Activity, diet and bowels are normal. Minimal pain. But she does have some burning pain in the right axilla probably related to the sentinel node biopsy. OBJECTIVE: Appears well. Wound is healing well without complications or infection. Perhaps mild seroma but no hematomas or obvious infection. ASSESSMENT: Normal postoperative course, doing well. Pathology report showed 2 lesions one measuring 1.65 cm and the other 1.5 cm. Margins were negative. 2 sentinel nodes were negative. The tumors are estrogen and progesterone receptor positive but HER-2 negative. DCIS was about 1 mm from the lateral margin. PLAN: Sutures removed. Patient given a copy of the path report and we explained that in detail to the patient and her family. The patient wants to see Dr. Jeremias Whitehead for a medical oncology consult. She was given a stretching exercise for the right arm. She is to call if she has any problems. She will be seen in about 3 months by Dr. Ranjana Benitez    Follow-up Disposition:  Return in about 4 weeks (around 9/18/2018). Christian Jovel MD    Please note: This document has been produced using voice recognition software. Unrecognizable air is in transcription may be present.

## 2018-08-21 NOTE — PROGRESS NOTES
Chief Complaint   Patient presents with    Surgical Follow-up     post op right breast lumpectomy with 2 wire localization of 2 tumors with sentinel node biopsy. Axillary pain 7/10 with burning sharp goes down center of arm pit to down axillary. Decreased sleep past 2 nights. Patients appetite is good, denies N/V/D.      1. Have you been to the ER, urgent care clinic since your last visit? Hospitalized since your last visit? No    2. Have you seen or consulted any other health care providers outside of the [facility] since your last visit? Include any pap smears or colon screening.  No

## 2018-08-21 NOTE — PATIENT INSTRUCTIONS
If you have any questions or concerns about today's appointment, the verbal and/or written instructions you were given for follow up care, please call our office at [phone].     [facility] - [address]    [phone] office  [phone] fax      Appointment: Friday, August 24, 2018 at 9:00am/check in at 8:30am with Dr. Oumou Ybarra located at [address] [place] [address] O) [phone]

## 2018-08-21 NOTE — LETTER
NOTIFICATION RETURN TO WORK / SCHOOL 
 
8/21/2018 1:15 PM 
 
Ms. Jermain Blue [address] [facility] [id number] To Whom It May Concern: 
 
Jermain Blue is currently under the care of Keith Damon. She will return to work/school on: 08/28/2018. If there are questions or concerns please have the patient contact our office. Sincerely, Christian Jovel MD

## 2018-08-21 NOTE — NURSE NAVIGATOR
Post op f/u ov with Dr Vanessa Foy. Pt having pain to right axilla area. Uncomfortable sleeping at night. Will use ibuprofen to help with pain. Will consult with Dr Griselda Vega on 18 to discuss any role for chemotherapy and hormone blockers. Has f/u ov with radiation oncology on 18. Will go back to work in 18. F/u ov with Dr Joe Amaro in three months. All questions answered. NCCN Distress Tool    Tayler Ramirez  was reassessed for management of distress using the NCCN Distress Thermometer for Patients tool. Mild to moderate distress  Scorin-4        Yes    No    -Practical/physical issues       [x]      []      -Provide community resources      [x]      []      -Provide list of support groups      [x]      []      -Provide list of national organizations and websites              [x]      []      -Provide ongoing supportive care by oncology medical team        [x]      []                  Comments/Referrals/Services provided:  . Moderate to severe distress  Scorin or greater                   Yes    No    -Navigator consulted with MD      [x]      []     -Navigator follow up         [x]      []     -Spiritual concerns        []      [x]     -Emotional/family concerns       [x]      []     -Refer to /mental health professional/PCP   [x]      []      Comments/Referral/Services provided: Score:5. Dr Mo Brito aware. She will be reassessed int he future.

## 2018-08-21 NOTE — MR AVS SNAPSHOT
[address] [address] [phone] Patient: Merline Guerra MRN: [id number] WVU:[id number] Visit Information Date & Time Provider Department Dept. Phone Encounter #  
 8/21/2018 12:45 PM Li Glez MD [address] [phone] [id number] Follow-up Instructions Return in about 3 months (around 11/21/2018). Upcoming Health Maintenance Date Due Hepatitis C Screening 1960 Pneumococcal 19-64 Highest Risk (1 of 3 - PCV13) 2/8/1981 DTaP/Tdap/Td series (1 - Tdap) 2/8/1983 PAP AKA CERVICAL CYTOLOGY 2/8/1983 FOBT Q 1 YEAR AGE 50-75 2/8/2012 Influenza Age 5 to Adult 8/1/2018 BREAST CANCER SCRN MAMMOGRAM 7/18/2020 Allergies as of 8/21/2018  Review Complete On: 8/21/2018 By: Li Glez MD  
  
 Severity Noted Reaction Type Reactions Pcn [Penicillins] High 03/12/2013   Systemic Rash Hives with PCN G benzathine Current Immunizations  Reviewed on 5/24/2013 No immunizations on file. Not reviewed this visit You Were Diagnosed With   
  
 Codes Comments Malignant neoplasm of upper-outer quadrant of right breast in female, estrogen receptor positive ([facility]    -  Primary ICD-10-CM: C50.411, Z17.0 ICD-9-CM: 174.4, V86.0 Vitals BP Pulse Temp Resp Height(growth percentile) Weight(growth percentile) 120/56 (BP 1 Location: Right arm, BP Patient Position: Sitting) 93 96.9 °F (36.1 °C) (Oral) 16 5' 10\" (1.778 m) 148 lb 6.4 oz (67.3 kg) SpO2 BMI OB Status Smoking Status 97% 21.29 kg/m2 Hysterectomy Current Every Day Smoker BMI and BSA Data Body Mass Index Body Surface Area  
 21.29 kg/m 2 1.82 m 2 Preferred Pharmacy Pharmacy Name Phone [facility] [phone] Your Updated Medication List  
  
   
 This list is accurate as of 8/21/18  1:24 PM.  Always use your most recent med list.  
  
  
  
  
 HYDROcodone-acetaminophen 5-300 mg tablet Commonly known as:  Jah Curlin Take 1 Tab by mouth every four (4) hours as needed. Max Daily Amount: 6 Tabs. IBUPROFEN PO Take  by mouth. nortriptyline 10 mg capsule Commonly known as:  PAMELOR  
  
 TYLENOL EXTRA STRENGTH 500 mg tablet Generic drug:  acetaminophen Take  by mouth every six (6) hours as needed for Pain. Follow-up Instructions Return in about 3 months (around 11/21/2018). To-Do List   
 09/07/2018 8:00 AM  
  Appointment with [address] [place] [address] at [facility] RADIATION THERAPY ([phone]) ATTENTION: The Appointment Time in [address] may not reflect your scheduled appointment time as the time is only a place soriano. If you have any questions about your appointment time, please call [facility]iation Therapy at [phone]. Patient Instructions If you have any questions or concerns about today's appointment, the verbal and/or written instructions you were given for follow up care, please call our office at [phone]. [facility] - [address], [address] 
 
[phone] office [phone] fax Appointment: Friday, August 24, 2018 at 9:00am/check in at 8:30am with Dr. Kaykay Graf located at [address] [place] [address] (e) [phone] Introducing [facility]! [facility] introduces [employer] patient portal. Now you can access parts of your medical record, email your doctor's office, and request medication refills online. 1. In your internet browser, go to [email or web address] 2. Click on the First Time User? Click Here link in the Sign In box. You will see the New Member Sign Up page. 3. Enter your [employer] Access Code exactly as it appears below.  You will not need to use this code after youve completed the sign-up process. If you do not sign up before the expiration date, you must request a new code. · [employer] Access Code: [email or web address] Expires: 10/8/2018 10:12 AM 
 
4. Enter the last four digits of your Social Security Number (xxxx) and Date of Birth (mm/dd/yyyy) as indicated and click Submit. You will be taken to the next sign-up page. 5. Create a [employer] ID. This will be your [employer] login ID and cannot be changed, so think of one that is secure and easy to remember. 6. Create a [employer] password. You can change your password at any time. 7. Enter your Password Reset Question and Answer. This can be used at a later time if you forget your password. 8. Enter your e-mail address. You will receive e-mail notification when new information is available in [address] 9. Click Sign Up. You can now view and download portions of your medical record. 10. Click the Download Summary menu link to download a portable copy of your medical information. If you have questions, please visit the Frequently Asked Questions section of the [employer] website. Remember, [employer] is NOT to be used for urgent needs. For medical emergencies, dial 911. Now available from your iPhone and Android! Please provide this summary of care documentation to your next provider. Your primary care clinician is listed as Jim More. If you have any questions after today's visit, please call [phone].

## 2018-08-21 NOTE — COMMUNICATION BODY
Dear Katy York came to the office today for follow-up from her recent right breast lumpectomy and sentinel node biopsy. I am pleased to report that she is doing quite well. Her pathology report showed 2 separate lesions measuring 1.65 and 1.5 cm. Both are hormone receptor positive and HER-2 negative. Her 2 sentinel nodes were negative. We have referred her to Dr. Raciel Weaver as she requested for a medical oncology consultation regarding whether or not he would recommend chemotherapy. She has already been seen by the radiation therapist which she will need subsequently. My new partner, our fellowship trained breast surgeon Dr. Meena Albarran, has seen the patient with me and will be following her in light of my planned senior living. Thank you again very much for allowing us to be involved with you in her care.     With kindest regards,    Jeana Maguire MD

## 2018-08-28 ENCOUNTER — TELEPHONE (OUTPATIENT)
Dept: OTHER | Age: 58
End: 2018-08-28

## 2018-08-28 ENCOUNTER — TELEPHONE (OUTPATIENT)
Dept: SURGERY | Age: 58
End: 2018-08-28

## 2018-08-28 NOTE — TELEPHONE ENCOUNTER
Pt left message for me to call her. Return call to pt. Unable to speak with her at this time. Left message for her to call me back.

## 2018-09-07 ENCOUNTER — APPOINTMENT (OUTPATIENT)
Dept: RADIATION THERAPY | Age: 58
End: 2018-09-07

## 2018-09-17 ENCOUNTER — TELEPHONE (OUTPATIENT)
Dept: OTHER | Age: 58
End: 2018-09-17

## 2018-09-17 NOTE — TELEPHONE ENCOUNTER
F/u call to pt to discuss future treatment plan. Unable to speak with her at this time. Left message for her to call me.

## 2018-09-19 ENCOUNTER — NURSE NAVIGATOR (OUTPATIENT)
Dept: OTHER | Age: 58
End: 2018-09-19

## 2018-09-19 NOTE — NURSE NAVIGATOR
Pt cancelled her appt with radiation oncology to be CT simulated on 9-7-19 because she is deciding if she will get radiation therapy. Left message for pt to call me.

## 2018-09-20 ENCOUNTER — TELEPHONE (OUTPATIENT)
Dept: OTHER | Age: 58
End: 2018-09-20

## 2018-09-20 NOTE — TELEPHONE ENCOUNTER
Call to Dr Estephania He office to determine if Oncotype DX testing has been completed. Unable to speak with Dallin Angela, Dr Estephania He nurse. Left message and number for her to call me.

## 2018-09-26 ENCOUNTER — HOSPITAL ENCOUNTER (OUTPATIENT)
Dept: LAB | Age: 58
Discharge: HOME | End: 2018-09-26

## 2018-09-26 ENCOUNTER — TELEPHONE (OUTPATIENT)
Dept: SURGERY | Age: 58
End: 2018-09-26

## 2018-09-26 NOTE — TELEPHONE ENCOUNTER
Could not LVM it was full. Sent e-mail to schedule 3 month follow up with Dr. Familia Walters around 11- per jorge a workflow bucket.

## 2018-10-02 ENCOUNTER — TELEPHONE (OUTPATIENT)
Dept: OTHER | Age: 58
End: 2018-10-02

## 2018-10-02 NOTE — TELEPHONE ENCOUNTER
F/u call to pt. She states Oncotype DX was sent last week. She should have results by end of this week so that she will know future treatment plan. If she does not need any type of chemotherapy she will schedule her radiation therapy treatments. All questions answered.

## 2018-10-15 ENCOUNTER — TELEPHONE (OUTPATIENT)
Dept: OTHER | Age: 58
End: 2018-10-15

## 2018-10-15 NOTE — TELEPHONE ENCOUNTER
F/u call to pt regarding her f/y ov with Dr Tyson Marcus. Jose Carlos Dye to speak with pt at this time. Left message for her to call me.

## 2018-10-25 ENCOUNTER — NURSE NAVIGATOR (OUTPATIENT)
Dept: OTHER | Age: 58
End: 2018-10-25

## 2018-10-25 NOTE — NURSE NAVIGATOR
F/u call to pt to ask if she is going to receive radiation therapy at [facility] or is she getting treatment through [facility]. 15. Unable to speak with pt at this time and unable to leave message.

## 2018-11-05 ENCOUNTER — TELEPHONE (OUTPATIENT)
Dept: OTHER | Age: 58
End: 2018-11-05

## 2018-11-09 ENCOUNTER — NURSE NAVIGATOR (OUTPATIENT)
Dept: OTHER | Age: 58
End: 2018-11-09

## 2023-06-23 NOTE — PROGRESS NOTES
Chief Complaint   Patient presents with   [facility] Breast Mass     Surgical evaluation of right breast mass s/p right breast biopsy for invasive ductal carcinoma 18. Denies breast pain. Last mammogram: 18    Breast pain? n    Do you do self breast exams? n    Do you feel any abnormal areas on your breast(s)? Yes can fell mass in breast    Do you have any nipple discharge/drainage? Color? n    Any discoloration of the skin on/around breast? n    Consume caffeine? yes               How much? 16oz    Menarche age: 17 yo    When was your last menstrual cycle? 2016    Start of menopause? Yes/2017    Birth control:      y                 How long? 20yrs     Type: pills    :          4               Para:               4             Abortions:    Age of first pregnancy: 20yr    Breast feed? n                        Months total:    Fam hx of breast ca? yes                Relation:   PGM Mom Mgreat aunt        Age dx: 76    Fam: hx of ovarian ca?       n/a              Relation:                              Age dx:    Pt hx or breast or ovarian ca? yes                  Age of dx?  9FK none

## (undated) DEVICE — (D)PREP SKN CHLRAPRP APPL 26ML -- CONVERT TO ITEM 371833

## (undated) DEVICE — SYR 10ML CTRL LR LCK NSAF LF --

## (undated) DEVICE — Z DISCONTINUED USE 2219801 STAPLER SKIN REG CRWN L5.7MM LEG L3.9MM WIRE DIA0.53MM PROX

## (undated) DEVICE — APPLICATOR BNDG 1MM ADH PREMIERPRO EXOFIN

## (undated) DEVICE — ZINACTIVE USE 2641837 CLIP LIG M BLU TI HRT SHP WIRE HORZ 600 PER BX

## (undated) DEVICE — DRAPE,REIN 53X77,STERILE: Brand: MEDLINE

## (undated) DEVICE — SUTURE VCRL SZ 3-0 L27IN ABSRB UD L26MM SH 1/2 CIR J416H

## (undated) DEVICE — NEEDLE HYPO 25GA L1.5IN BLU POLYPR HUB S STL REG BVL STR

## (undated) DEVICE — OCCLUSIVE GAUZE STRIP,3% BISMUTH TRIBROMOPHENATE IN PETROLATUM BLEND: Brand: XEROFORM

## (undated) DEVICE — SUTURE MCRYL SZ 4-0 L18IN ABSRB UD L19MM PS-2 3/8 CIR PRIM Y496G

## (undated) DEVICE — COVER LT HNDL BLU PLAS

## (undated) DEVICE — PAD,NON-ADHERENT,3X8,STERILE,LF,1/PK: Brand: MEDLINE

## (undated) DEVICE — SUTURE VCRL SZ 3-0 L18IN ABSRB UD POLYGLACTIN 910 BRAID TIE J910T

## (undated) DEVICE — TABLE COVER: Brand: CONVERTORS

## (undated) DEVICE — KENDALL SCD EXPRESS SLEEVES, KNEE LENGTH, MEDIUM: Brand: KENDALL SCD

## (undated) DEVICE — SPONGE: SPECIALTY CHERRY DISS XR 100/CS: Brand: MEDICAL ACTION INDUSTRIES

## (undated) DEVICE — DEPAUL MAJOR PROCEDURE PACK: Brand: MEDLINE INDUSTRIES, INC.

## (undated) DEVICE — SUTURE VCRL SZ 2-0 L12X18IN ABSRB UD POLYGLACTIN 910 BRAID J911T

## (undated) DEVICE — SUT ETHLN 3-0 18IN PS1 BLK --

## (undated) DEVICE — INSULATED BLADE ELECTRODE: Brand: EDGE

## (undated) DEVICE — SYR LR LCK 1ML GRAD NSAF 30ML --

## (undated) DEVICE — 3M™ TEGADERM™ TRANSPARENT FILM DRESSING FRAME STYLE, 1626W, 4 IN X 4-3/4 IN (10 CM X 12 CM), 50/CT 4CT/CASE: Brand: 3M™ TEGADERM™

## (undated) DEVICE — SUTURE PROL SZ 4-0 L18IN NONABSORBABLE BLU L19MM PC-5 3/8 8631G

## (undated) DEVICE — CONVERTORS STOCKINETTE: Brand: CONVERTORS

## (undated) DEVICE — BANDAGE COMPR W4INXL5YD BGE COHESIVE SELF ADH ADBAN CBN1104] AVCOR HEALTHCARE PRODUCTS INC]

## (undated) DEVICE — PAD,ABDOMINAL,5"X9",STERILE,LF,1/PK: Brand: MEDLINE INDUSTRIES, INC.

## (undated) DEVICE — SOLUTION IV 1000ML 0.9% SOD CHL

## (undated) DEVICE — NEEDLE HYPO 30GA L0.5IN BGE POLYPR HUB S STL REG BVL STR

## (undated) DEVICE — (D)STRIP SKN CLSR 0.5X4IN WHT --

## (undated) DEVICE — STERILE POLYISOPRENE POWDER-FREE SURGICAL GLOVES: Brand: PROTEXIS

## (undated) DEVICE — CLIP INT SM WIDE RED TI TRNSVRS GRV CHEVRON SHP W PRECIS

## (undated) DEVICE — SUTURE VCRL SZ 2-0 L27IN ABSRB UD L26MM SH 1/2 CIR J417H